# Patient Record
Sex: MALE | Race: OTHER | HISPANIC OR LATINO
[De-identification: names, ages, dates, MRNs, and addresses within clinical notes are randomized per-mention and may not be internally consistent; named-entity substitution may affect disease eponyms.]

---

## 2017-01-24 PROBLEM — Z00.00 ENCOUNTER FOR PREVENTIVE HEALTH EXAMINATION: Status: ACTIVE | Noted: 2017-01-24

## 2017-02-14 ENCOUNTER — APPOINTMENT (OUTPATIENT)
Dept: ORTHOPEDIC SURGERY | Facility: CLINIC | Age: 67
End: 2017-02-14

## 2017-02-14 VITALS
WEIGHT: 200 LBS | BODY MASS INDEX: 33.32 KG/M2 | DIASTOLIC BLOOD PRESSURE: 87 MMHG | HEART RATE: 104 BPM | HEIGHT: 65 IN | SYSTOLIC BLOOD PRESSURE: 143 MMHG

## 2017-02-14 DIAGNOSIS — Z78.9 OTHER SPECIFIED HEALTH STATUS: ICD-10-CM

## 2017-02-14 DIAGNOSIS — Z86.79 PERSONAL HISTORY OF OTHER DISEASES OF THE CIRCULATORY SYSTEM: ICD-10-CM

## 2017-02-14 DIAGNOSIS — M47.812 SPONDYLOSIS W/OUT MYELOPATHY OR RADICULOPATHY, CERVICAL REGION: ICD-10-CM

## 2017-02-14 RX ORDER — LOSARTAN POTASSIUM 100 MG/1
TABLET, FILM COATED ORAL
Refills: 0 | Status: ACTIVE | COMMUNITY

## 2017-02-16 ENCOUNTER — APPOINTMENT (OUTPATIENT)
Dept: PHYSICAL MEDICINE AND REHAB | Facility: CLINIC | Age: 67
End: 2017-02-16

## 2017-02-16 VITALS
SYSTOLIC BLOOD PRESSURE: 147 MMHG | BODY MASS INDEX: 33.32 KG/M2 | WEIGHT: 200 LBS | OXYGEN SATURATION: 96 % | HEART RATE: 107 BPM | DIASTOLIC BLOOD PRESSURE: 86 MMHG | HEIGHT: 65 IN

## 2017-02-16 DIAGNOSIS — M41.25 OTHER IDIOPATHIC SCOLIOSIS, THORACOLUMBAR REGION: ICD-10-CM

## 2017-02-21 ENCOUNTER — APPOINTMENT (OUTPATIENT)
Dept: PHYSICAL MEDICINE AND REHAB | Facility: CLINIC | Age: 67
End: 2017-02-21

## 2017-02-22 ENCOUNTER — OUTPATIENT (OUTPATIENT)
Dept: OUTPATIENT SERVICES | Facility: HOSPITAL | Age: 67
LOS: 1 days | End: 2017-02-22
Payer: COMMERCIAL

## 2017-02-22 DIAGNOSIS — Z51.89 ENCOUNTER FOR OTHER SPECIFIED AFTERCARE: ICD-10-CM

## 2017-02-22 DIAGNOSIS — M45.5 ANKYLOSING SPONDYLITIS OF THORACOLUMBAR REGION: ICD-10-CM

## 2017-05-01 ENCOUNTER — APPOINTMENT (OUTPATIENT)
Dept: PHYSICAL MEDICINE AND REHAB | Facility: CLINIC | Age: 67
End: 2017-05-01

## 2017-05-01 VITALS
SYSTOLIC BLOOD PRESSURE: 158 MMHG | WEIGHT: 200 LBS | HEIGHT: 65 IN | OXYGEN SATURATION: 97 % | BODY MASS INDEX: 33.32 KG/M2 | DIASTOLIC BLOOD PRESSURE: 82 MMHG | HEART RATE: 107 BPM

## 2017-05-01 DIAGNOSIS — M47.816 SPONDYLOSIS W/OUT MYELOPATHY OR RADICULOPATHY, LUMBAR REGION: ICD-10-CM

## 2017-05-01 DIAGNOSIS — M60.9 MYOSITIS, UNSPECIFIED: ICD-10-CM

## 2017-05-03 PROBLEM — M47.816 LUMBAR SPONDYLOSIS: Status: ACTIVE | Noted: 2017-02-14

## 2017-05-03 PROBLEM — M60.9 MYOSITIS: Status: ACTIVE | Noted: 2017-05-03

## 2017-05-10 PROCEDURE — 97162 PT EVAL MOD COMPLEX 30 MIN: CPT

## 2017-05-10 PROCEDURE — 97010 HOT OR COLD PACKS THERAPY: CPT

## 2017-05-10 PROCEDURE — 97140 MANUAL THERAPY 1/> REGIONS: CPT

## 2017-05-10 PROCEDURE — 97110 THERAPEUTIC EXERCISES: CPT

## 2017-06-05 ENCOUNTER — APPOINTMENT (OUTPATIENT)
Dept: PHYSICAL MEDICINE AND REHAB | Facility: CLINIC | Age: 67
End: 2017-06-05

## 2017-06-18 ENCOUNTER — EMERGENCY (EMERGENCY)
Facility: HOSPITAL | Age: 67
LOS: 1 days | Discharge: HOME | End: 2017-06-18
Attending: EMERGENCY MEDICINE
Payer: COMMERCIAL

## 2017-06-18 VITALS
SYSTOLIC BLOOD PRESSURE: 152 MMHG | WEIGHT: 190.04 LBS | HEIGHT: 65 IN | OXYGEN SATURATION: 98 % | RESPIRATION RATE: 20 BRPM | HEART RATE: 125 BPM | TEMPERATURE: 99 F | DIASTOLIC BLOOD PRESSURE: 90 MMHG

## 2017-06-18 VITALS
RESPIRATION RATE: 16 BRPM | HEART RATE: 84 BPM | OXYGEN SATURATION: 98 % | TEMPERATURE: 98 F | DIASTOLIC BLOOD PRESSURE: 81 MMHG | SYSTOLIC BLOOD PRESSURE: 136 MMHG

## 2017-06-18 LAB
ALBUMIN SERPL ELPH-MCNC: 4.3 G/DL — SIGNIFICANT CHANGE UP (ref 3.3–5.2)
ALP SERPL-CCNC: 75 U/L — SIGNIFICANT CHANGE UP (ref 40–120)
ALT FLD-CCNC: 33 U/L — SIGNIFICANT CHANGE UP
ANION GAP SERPL CALC-SCNC: 15 MMOL/L — SIGNIFICANT CHANGE UP (ref 5–17)
APTT BLD: 28.5 SEC — SIGNIFICANT CHANGE UP (ref 27.5–37.4)
AST SERPL-CCNC: 12 U/L — SIGNIFICANT CHANGE UP
BASOPHILS # BLD AUTO: 0 K/UL — SIGNIFICANT CHANGE UP (ref 0–0.2)
BASOPHILS NFR BLD AUTO: 0.1 % — SIGNIFICANT CHANGE UP (ref 0–2)
BILIRUB SERPL-MCNC: 0.2 MG/DL — LOW (ref 0.4–2)
BUN SERPL-MCNC: 19 MG/DL — SIGNIFICANT CHANGE UP (ref 8–20)
CALCIUM SERPL-MCNC: 9.3 MG/DL — SIGNIFICANT CHANGE UP (ref 8.6–10.2)
CHLORIDE SERPL-SCNC: 99 MMOL/L — SIGNIFICANT CHANGE UP (ref 98–107)
CO2 SERPL-SCNC: 25 MMOL/L — SIGNIFICANT CHANGE UP (ref 22–29)
CREAT SERPL-MCNC: 0.86 MG/DL — SIGNIFICANT CHANGE UP (ref 0.5–1.3)
D DIMER BLD IA.RAPID-MCNC: <150 NG/ML DDU — SIGNIFICANT CHANGE UP
GLUCOSE SERPL-MCNC: 254 MG/DL — HIGH (ref 70–115)
HCT VFR BLD CALC: 42.3 % — SIGNIFICANT CHANGE UP (ref 42–52)
HGB BLD-MCNC: 14 G/DL — SIGNIFICANT CHANGE UP (ref 14–18)
INR BLD: 0.99 RATIO — SIGNIFICANT CHANGE UP (ref 0.88–1.16)
LYMPHOCYTES # BLD AUTO: 16.1 % — LOW (ref 20–55)
LYMPHOCYTES # BLD AUTO: 2.5 K/UL — SIGNIFICANT CHANGE UP (ref 1–4.8)
MCHC RBC-ENTMCNC: 27.8 PG — SIGNIFICANT CHANGE UP (ref 27–31)
MCHC RBC-ENTMCNC: 33.1 G/DL — SIGNIFICANT CHANGE UP (ref 32–36)
MCV RBC AUTO: 84.1 FL — SIGNIFICANT CHANGE UP (ref 80–94)
MONOCYTES # BLD AUTO: 1.2 K/UL — HIGH (ref 0–0.8)
MONOCYTES NFR BLD AUTO: 7.8 % — SIGNIFICANT CHANGE UP (ref 3–10)
NEUTROPHILS # BLD AUTO: 11.9 K/UL — HIGH (ref 1.8–8)
NEUTROPHILS NFR BLD AUTO: 75.1 % — HIGH (ref 37–73)
NT-PROBNP SERPL-SCNC: 41 PG/ML — SIGNIFICANT CHANGE UP (ref 0–300)
PLATELET # BLD AUTO: 367 K/UL — SIGNIFICANT CHANGE UP (ref 150–400)
POTASSIUM SERPL-MCNC: 4.2 MMOL/L — SIGNIFICANT CHANGE UP (ref 3.5–5.3)
POTASSIUM SERPL-SCNC: 4.2 MMOL/L — SIGNIFICANT CHANGE UP (ref 3.5–5.3)
PROT SERPL-MCNC: 7.3 G/DL — SIGNIFICANT CHANGE UP (ref 6.6–8.7)
PROTHROM AB SERPL-ACNC: 10.9 SEC — SIGNIFICANT CHANGE UP (ref 9.8–12.7)
RBC # BLD: 5.03 M/UL — SIGNIFICANT CHANGE UP (ref 4.6–6.2)
RBC # FLD: 14.5 % — SIGNIFICANT CHANGE UP (ref 11–15.6)
SODIUM SERPL-SCNC: 139 MMOL/L — SIGNIFICANT CHANGE UP (ref 135–145)
TROPONIN T SERPL-MCNC: <0.01 NG/ML — SIGNIFICANT CHANGE UP (ref 0–0.06)
WBC # BLD: 15.8 K/UL — HIGH (ref 4.8–10.8)
WBC # FLD AUTO: 15.8 K/UL — HIGH (ref 4.8–10.8)

## 2017-06-18 PROCEDURE — 99285 EMERGENCY DEPT VISIT HI MDM: CPT

## 2017-06-18 PROCEDURE — 85027 COMPLETE CBC AUTOMATED: CPT

## 2017-06-18 PROCEDURE — 94640 AIRWAY INHALATION TREATMENT: CPT

## 2017-06-18 PROCEDURE — 93005 ELECTROCARDIOGRAM TRACING: CPT

## 2017-06-18 PROCEDURE — 84484 ASSAY OF TROPONIN QUANT: CPT

## 2017-06-18 PROCEDURE — 80053 COMPREHEN METABOLIC PANEL: CPT

## 2017-06-18 PROCEDURE — 93010 ELECTROCARDIOGRAM REPORT: CPT

## 2017-06-18 PROCEDURE — 85610 PROTHROMBIN TIME: CPT

## 2017-06-18 PROCEDURE — 71020: CPT | Mod: 26

## 2017-06-18 PROCEDURE — 71046 X-RAY EXAM CHEST 2 VIEWS: CPT

## 2017-06-18 PROCEDURE — 99284 EMERGENCY DEPT VISIT MOD MDM: CPT | Mod: 25

## 2017-06-18 PROCEDURE — 85379 FIBRIN DEGRADATION QUANT: CPT

## 2017-06-18 PROCEDURE — 83880 ASSAY OF NATRIURETIC PEPTIDE: CPT

## 2017-06-18 PROCEDURE — 85730 THROMBOPLASTIN TIME PARTIAL: CPT

## 2017-06-18 PROCEDURE — T1013: CPT

## 2017-06-18 RX ORDER — IPRATROPIUM/ALBUTEROL SULFATE 18-103MCG
3 AEROSOL WITH ADAPTER (GRAM) INHALATION ONCE
Qty: 0 | Refills: 0 | Status: COMPLETED | OUTPATIENT
Start: 2017-06-18 | End: 2017-06-18

## 2017-06-18 RX ORDER — SODIUM CHLORIDE 9 MG/ML
3 INJECTION INTRAMUSCULAR; INTRAVENOUS; SUBCUTANEOUS ONCE
Qty: 0 | Refills: 0 | Status: COMPLETED | OUTPATIENT
Start: 2017-06-18 | End: 2017-06-18

## 2017-06-18 RX ADMIN — Medication 50 MILLIGRAM(S): at 18:25

## 2017-06-18 RX ADMIN — Medication 3 MILLILITER(S): at 18:25

## 2017-06-18 RX ADMIN — SODIUM CHLORIDE 3 MILLILITER(S): 9 INJECTION INTRAMUSCULAR; INTRAVENOUS; SUBCUTANEOUS at 16:01

## 2017-06-18 RX ADMIN — Medication 1 TABLET(S): at 18:25

## 2017-06-18 RX ADMIN — Medication 3 MILLILITER(S): at 16:01

## 2017-06-18 NOTE — ED ADULT NURSE NOTE - OBJECTIVE STATEMENT
Patient reports reaction to cleaning product 2 weeks ago that caused SOB, went to PMD and was prescribed nebulizer and steroid. Patient reports sob persist worsening at night with pleuritic chest discomfort. Patient lungs cta b/l no acute distress noted. no peripheral edema noted.

## 2017-06-18 NOTE — ED PROVIDER NOTE - OBJECTIVE STATEMENT
67 year old male former smoker presents with sob. pt states that 2 weeks ago he used a cleaning solution on his floor and it caused him to have difficulty breathing. He wnet to his PCP and was given 2 inhalers and prednisone which he is still taking. pt states that he continues to wheeze at night and today he had fever and chills. pt also c/o pnd and orthopnea but no pedal edema. pt also denies any recent travel or leg pain

## 2017-06-18 NOTE — ED PROVIDER NOTE - PROGRESS NOTE DETAILS
pt feels much better and is able to ambulate without sob. pt to follow up with pulmonary and to continue to use his inhalers and prednisone 50mg tablets daily as previously

## 2017-07-03 ENCOUNTER — OUTPATIENT (OUTPATIENT)
Dept: OUTPATIENT SERVICES | Facility: HOSPITAL | Age: 67
LOS: 1 days | End: 2017-07-03
Payer: COMMERCIAL

## 2017-07-03 ENCOUNTER — EMERGENCY (EMERGENCY)
Facility: HOSPITAL | Age: 67
LOS: 1 days | Discharge: HOME | End: 2017-07-03
Attending: EMERGENCY MEDICINE
Payer: COMMERCIAL

## 2017-07-03 ENCOUNTER — APPOINTMENT (OUTPATIENT)
Dept: PHYSICAL MEDICINE AND REHAB | Facility: CLINIC | Age: 67
End: 2017-07-03

## 2017-07-03 VITALS
RESPIRATION RATE: 20 BRPM | HEART RATE: 123 BPM | WEIGHT: 179.9 LBS | TEMPERATURE: 98 F | DIASTOLIC BLOOD PRESSURE: 76 MMHG | OXYGEN SATURATION: 98 % | SYSTOLIC BLOOD PRESSURE: 117 MMHG

## 2017-07-03 VITALS
DIASTOLIC BLOOD PRESSURE: 82 MMHG | HEART RATE: 89 BPM | OXYGEN SATURATION: 99 % | SYSTOLIC BLOOD PRESSURE: 148 MMHG | RESPIRATION RATE: 20 BRPM

## 2017-07-03 DIAGNOSIS — M47.816 SPONDYLOSIS WITHOUT MYELOPATHY OR RADICULOPATHY, LUMBAR REGION: ICD-10-CM

## 2017-07-03 LAB
ALBUMIN SERPL ELPH-MCNC: 4.1 G/DL — SIGNIFICANT CHANGE UP (ref 3.3–5.2)
ALP SERPL-CCNC: 85 U/L — SIGNIFICANT CHANGE UP (ref 40–120)
ALT FLD-CCNC: 42 U/L — HIGH
ANION GAP SERPL CALC-SCNC: 15 MMOL/L — SIGNIFICANT CHANGE UP (ref 5–17)
APTT BLD: 29.7 SEC — SIGNIFICANT CHANGE UP (ref 27.5–37.4)
AST SERPL-CCNC: 18 U/L — SIGNIFICANT CHANGE UP
BASOPHILS # BLD AUTO: 0 K/UL — SIGNIFICANT CHANGE UP (ref 0–0.2)
BASOPHILS NFR BLD AUTO: 0.2 % — SIGNIFICANT CHANGE UP (ref 0–2)
BILIRUB SERPL-MCNC: 0.6 MG/DL — SIGNIFICANT CHANGE UP (ref 0.4–2)
BUN SERPL-MCNC: 19 MG/DL — SIGNIFICANT CHANGE UP (ref 8–20)
CALCIUM SERPL-MCNC: 8.4 MG/DL — LOW (ref 8.6–10.2)
CHLORIDE SERPL-SCNC: 98 MMOL/L — SIGNIFICANT CHANGE UP (ref 98–107)
CO2 SERPL-SCNC: 24 MMOL/L — SIGNIFICANT CHANGE UP (ref 22–29)
CREAT SERPL-MCNC: 0.89 MG/DL — SIGNIFICANT CHANGE UP (ref 0.5–1.3)
EOSINOPHIL # BLD AUTO: 0.2 K/UL — SIGNIFICANT CHANGE UP (ref 0–0.5)
EOSINOPHIL NFR BLD AUTO: 1.7 % — SIGNIFICANT CHANGE UP (ref 0–5)
GLUCOSE SERPL-MCNC: 327 MG/DL — HIGH (ref 70–115)
HCT VFR BLD CALC: 40.9 % — LOW (ref 42–52)
HGB BLD-MCNC: 13.6 G/DL — LOW (ref 14–18)
INR BLD: 1.04 RATIO — SIGNIFICANT CHANGE UP (ref 0.88–1.16)
LYMPHOCYTES # BLD AUTO: 2.7 K/UL — SIGNIFICANT CHANGE UP (ref 1–4.8)
LYMPHOCYTES # BLD AUTO: 31.4 % — SIGNIFICANT CHANGE UP (ref 20–55)
MCHC RBC-ENTMCNC: 27.6 PG — SIGNIFICANT CHANGE UP (ref 27–31)
MCHC RBC-ENTMCNC: 33.3 G/DL — SIGNIFICANT CHANGE UP (ref 32–36)
MCV RBC AUTO: 83.1 FL — SIGNIFICANT CHANGE UP (ref 80–94)
MONOCYTES # BLD AUTO: 0.7 K/UL — SIGNIFICANT CHANGE UP (ref 0–0.8)
MONOCYTES NFR BLD AUTO: 8.3 % — SIGNIFICANT CHANGE UP (ref 3–10)
NEUTROPHILS # BLD AUTO: 5 K/UL — SIGNIFICANT CHANGE UP (ref 1.8–8)
NEUTROPHILS NFR BLD AUTO: 58.1 % — SIGNIFICANT CHANGE UP (ref 37–73)
PLATELET # BLD AUTO: 230 K/UL — SIGNIFICANT CHANGE UP (ref 150–400)
POTASSIUM SERPL-MCNC: 4.3 MMOL/L — SIGNIFICANT CHANGE UP (ref 3.5–5.3)
POTASSIUM SERPL-SCNC: 4.3 MMOL/L — SIGNIFICANT CHANGE UP (ref 3.5–5.3)
PROT SERPL-MCNC: 7.2 G/DL — SIGNIFICANT CHANGE UP (ref 6.6–8.7)
PROTHROM AB SERPL-ACNC: 11.4 SEC — SIGNIFICANT CHANGE UP (ref 9.8–12.7)
RBC # BLD: 4.92 M/UL — SIGNIFICANT CHANGE UP (ref 4.6–6.2)
RBC # FLD: 13.8 % — SIGNIFICANT CHANGE UP (ref 11–15.6)
SODIUM SERPL-SCNC: 137 MMOL/L — SIGNIFICANT CHANGE UP (ref 135–145)
WBC # BLD: 8.6 K/UL — SIGNIFICANT CHANGE UP (ref 4.8–10.8)
WBC # FLD AUTO: 8.6 K/UL — SIGNIFICANT CHANGE UP (ref 4.8–10.8)

## 2017-07-03 PROCEDURE — 96374 THER/PROPH/DIAG INJ IV PUSH: CPT

## 2017-07-03 PROCEDURE — T1013: CPT

## 2017-07-03 PROCEDURE — 36415 COLL VENOUS BLD VENIPUNCTURE: CPT

## 2017-07-03 PROCEDURE — 99284 EMERGENCY DEPT VISIT MOD MDM: CPT | Mod: 25

## 2017-07-03 PROCEDURE — 85027 COMPLETE CBC AUTOMATED: CPT

## 2017-07-03 PROCEDURE — 85379 FIBRIN DEGRADATION QUANT: CPT

## 2017-07-03 PROCEDURE — 93010 ELECTROCARDIOGRAM REPORT: CPT

## 2017-07-03 PROCEDURE — 76000 FLUOROSCOPY <1 HR PHYS/QHP: CPT

## 2017-07-03 PROCEDURE — 99284 EMERGENCY DEPT VISIT MOD MDM: CPT

## 2017-07-03 PROCEDURE — 93005 ELECTROCARDIOGRAM TRACING: CPT

## 2017-07-03 PROCEDURE — 85730 THROMBOPLASTIN TIME PARTIAL: CPT

## 2017-07-03 PROCEDURE — 80053 COMPREHEN METABOLIC PANEL: CPT

## 2017-07-03 PROCEDURE — 85610 PROTHROMBIN TIME: CPT

## 2017-07-03 RX ORDER — INSULIN HUMAN 100 [IU]/ML
5 INJECTION, SOLUTION SUBCUTANEOUS ONCE
Qty: 0 | Refills: 0 | Status: COMPLETED | OUTPATIENT
Start: 2017-07-03 | End: 2017-07-03

## 2017-07-03 RX ADMIN — INSULIN HUMAN 5 UNIT(S): 100 INJECTION, SOLUTION SUBCUTANEOUS at 21:04

## 2017-07-03 NOTE — ED PROVIDER NOTE - OBJECTIVE STATEMENT
66 year old male with a h/o copd presents with elevated heart rate. pt has a h/o back pain and went to see his doctor to get an injection and was foud to have a elevated heart rate. He was given fluids and sent to the ed for further work-up... pt denies any chest pain , sob cough or fever or abdominal pain or diarrhea

## 2017-07-03 NOTE — ED ADULT TRIAGE NOTE - CHIEF COMPLAINT QUOTE
was at Dr office for back pain and injection, they noticed rapid and irregular heart rate. they gave 2 liters IVF. no pain at this time, no dyspnea.

## 2017-07-03 NOTE — ED PROVIDER NOTE - CARE PLAN
Principal Discharge DX:	Hyperglycemia  Secondary Diagnosis:	Dehydration  Secondary Diagnosis:	Sinus tachycardia by electrocardiogram

## 2017-07-08 ENCOUNTER — INPATIENT (INPATIENT)
Facility: HOSPITAL | Age: 67
LOS: 3 days | Discharge: HOME | DRG: 639 | End: 2017-07-12
Attending: FAMILY MEDICINE | Admitting: INTERNAL MEDICINE
Payer: COMMERCIAL

## 2017-07-08 VITALS
DIASTOLIC BLOOD PRESSURE: 77 MMHG | OXYGEN SATURATION: 99 % | HEIGHT: 65 IN | WEIGHT: 199.96 LBS | RESPIRATION RATE: 16 BRPM | HEART RATE: 82 BPM | SYSTOLIC BLOOD PRESSURE: 127 MMHG | TEMPERATURE: 97 F

## 2017-07-08 DIAGNOSIS — E11.01 TYPE 2 DIABETES MELLITUS WITH HYPEROSMOLARITY WITH COMA: ICD-10-CM

## 2017-07-08 DIAGNOSIS — I10 ESSENTIAL (PRIMARY) HYPERTENSION: ICD-10-CM

## 2017-07-08 DIAGNOSIS — E11.9 TYPE 2 DIABETES MELLITUS WITHOUT COMPLICATIONS: ICD-10-CM

## 2017-07-08 LAB
ALBUMIN SERPL ELPH-MCNC: 4.4 G/DL — SIGNIFICANT CHANGE UP (ref 3.3–5.2)
ALP SERPL-CCNC: 101 U/L — SIGNIFICANT CHANGE UP (ref 40–120)
ALT FLD-CCNC: 39 U/L — SIGNIFICANT CHANGE UP
ANION GAP SERPL CALC-SCNC: 12 MMOL/L — SIGNIFICANT CHANGE UP (ref 5–17)
ANION GAP SERPL CALC-SCNC: 18 MMOL/L — HIGH (ref 5–17)
ANION GAP SERPL CALC-SCNC: 20 MMOL/L — HIGH (ref 5–17)
APPEARANCE UR: CLEAR — SIGNIFICANT CHANGE UP
APPEARANCE UR: CLEAR — SIGNIFICANT CHANGE UP
AST SERPL-CCNC: 15 U/L — SIGNIFICANT CHANGE UP
BACTERIA # UR AUTO: ABNORMAL
BASE EXCESS BLDA CALC-SCNC: -1.9 MMOL/L — SIGNIFICANT CHANGE UP (ref -3–3)
BILIRUB SERPL-MCNC: 0.4 MG/DL — SIGNIFICANT CHANGE UP (ref 0.4–2)
BILIRUB UR-MCNC: NEGATIVE — SIGNIFICANT CHANGE UP
BILIRUB UR-MCNC: NEGATIVE — SIGNIFICANT CHANGE UP
BUN SERPL-MCNC: 23 MG/DL — HIGH (ref 8–20)
BUN SERPL-MCNC: 32 MG/DL — HIGH (ref 8–20)
BUN SERPL-MCNC: 36 MG/DL — HIGH (ref 8–20)
CALCIUM SERPL-MCNC: 10 MG/DL — SIGNIFICANT CHANGE UP (ref 8.6–10.2)
CALCIUM SERPL-MCNC: 7.8 MG/DL — LOW (ref 8.6–10.2)
CALCIUM SERPL-MCNC: 9.7 MG/DL — SIGNIFICANT CHANGE UP (ref 8.6–10.2)
CHLORIDE SERPL-SCNC: 109 MMOL/L — HIGH (ref 98–107)
CHLORIDE SERPL-SCNC: 89 MMOL/L — LOW (ref 98–107)
CHLORIDE SERPL-SCNC: 98 MMOL/L — SIGNIFICANT CHANGE UP (ref 98–107)
CO2 SERPL-SCNC: 21 MMOL/L — LOW (ref 22–29)
CO2 SERPL-SCNC: 22 MMOL/L — SIGNIFICANT CHANGE UP (ref 22–29)
CO2 SERPL-SCNC: 23 MMOL/L — SIGNIFICANT CHANGE UP (ref 22–29)
COLOR SPEC: YELLOW — SIGNIFICANT CHANGE UP
COLOR SPEC: YELLOW — SIGNIFICANT CHANGE UP
CREAT SERPL-MCNC: 0.87 MG/DL — SIGNIFICANT CHANGE UP (ref 0.5–1.3)
CREAT SERPL-MCNC: 1.15 MG/DL — SIGNIFICANT CHANGE UP (ref 0.5–1.3)
CREAT SERPL-MCNC: 1.21 MG/DL — SIGNIFICANT CHANGE UP (ref 0.5–1.3)
DIFF PNL FLD: NEGATIVE — SIGNIFICANT CHANGE UP
DIFF PNL FLD: NEGATIVE — SIGNIFICANT CHANGE UP
EPI CELLS # UR: SIGNIFICANT CHANGE UP
GAS PNL BLDA: SIGNIFICANT CHANGE UP
GLUCOSE SERPL-MCNC: 248 MG/DL — HIGH (ref 70–115)
GLUCOSE SERPL-MCNC: 533 MG/DL — CRITICAL HIGH (ref 70–115)
GLUCOSE SERPL-MCNC: 933 MG/DL — CRITICAL HIGH (ref 70–115)
GLUCOSE UR QL: 1000 MG/DL
GLUCOSE UR QL: 250 MG/DL
HCO3 BLDA-SCNC: 23 MMOL/L — SIGNIFICANT CHANGE UP (ref 20–26)
HCT VFR BLD CALC: 40.1 % — LOW (ref 42–52)
HGB BLD-MCNC: 13.6 G/DL — LOW (ref 14–18)
HOROWITZ INDEX BLDA+IHG-RTO: 0.21 — SIGNIFICANT CHANGE UP
KETONES UR-MCNC: ABNORMAL
KETONES UR-MCNC: ABNORMAL
LEUKOCYTE ESTERASE UR-ACNC: NEGATIVE — SIGNIFICANT CHANGE UP
LEUKOCYTE ESTERASE UR-ACNC: NEGATIVE — SIGNIFICANT CHANGE UP
MCHC RBC-ENTMCNC: 27.8 PG — SIGNIFICANT CHANGE UP (ref 27–31)
MCHC RBC-ENTMCNC: 33.7 G/DL — SIGNIFICANT CHANGE UP (ref 32–36)
MCV RBC AUTO: 81.5 FL — SIGNIFICANT CHANGE UP (ref 80–94)
NITRITE UR-MCNC: NEGATIVE — SIGNIFICANT CHANGE UP
NITRITE UR-MCNC: NEGATIVE — SIGNIFICANT CHANGE UP
PCO2 BLDA: 39 MMHG — SIGNIFICANT CHANGE UP (ref 35–45)
PH BLDA: 7.38 — SIGNIFICANT CHANGE UP (ref 7.35–7.45)
PH UR: 5 — SIGNIFICANT CHANGE UP (ref 5–8)
PH UR: 6 — SIGNIFICANT CHANGE UP (ref 5–8)
PLATELET # BLD AUTO: 255 K/UL — SIGNIFICANT CHANGE UP (ref 150–400)
PO2 BLDA: 71 MMHG — LOW (ref 83–108)
POTASSIUM SERPL-MCNC: 3.6 MMOL/L — SIGNIFICANT CHANGE UP (ref 3.5–5.3)
POTASSIUM SERPL-MCNC: 3.7 MMOL/L — SIGNIFICANT CHANGE UP (ref 3.5–5.3)
POTASSIUM SERPL-MCNC: 5.4 MMOL/L — HIGH (ref 3.5–5.3)
POTASSIUM SERPL-SCNC: 3.6 MMOL/L — SIGNIFICANT CHANGE UP (ref 3.5–5.3)
POTASSIUM SERPL-SCNC: 3.7 MMOL/L — SIGNIFICANT CHANGE UP (ref 3.5–5.3)
POTASSIUM SERPL-SCNC: 5.4 MMOL/L — HIGH (ref 3.5–5.3)
PROT SERPL-MCNC: 7.5 G/DL — SIGNIFICANT CHANGE UP (ref 6.6–8.7)
PROT UR-MCNC: 15 MG/DL
PROT UR-MCNC: NEGATIVE MG/DL — SIGNIFICANT CHANGE UP
RBC # BLD: 4.9 M/UL — SIGNIFICANT CHANGE UP (ref 4.6–6.2)
RBC # FLD: 13.3 % — SIGNIFICANT CHANGE UP (ref 11–15.6)
RBC CASTS # UR COMP ASSIST: SIGNIFICANT CHANGE UP /HPF (ref 0–4)
SAO2 % BLDA: 93 % — LOW (ref 95–99)
SODIUM SERPL-SCNC: 129 MMOL/L — LOW (ref 135–145)
SODIUM SERPL-SCNC: 139 MMOL/L — SIGNIFICANT CHANGE UP (ref 135–145)
SODIUM SERPL-SCNC: 144 MMOL/L — SIGNIFICANT CHANGE UP (ref 135–145)
SP GR SPEC: 1 — LOW (ref 1.01–1.02)
SP GR SPEC: 1.01 — SIGNIFICANT CHANGE UP (ref 1.01–1.02)
UROBILINOGEN FLD QL: NEGATIVE MG/DL — SIGNIFICANT CHANGE UP
UROBILINOGEN FLD QL: NEGATIVE MG/DL — SIGNIFICANT CHANGE UP
WBC # BLD: 9.9 K/UL — SIGNIFICANT CHANGE UP (ref 4.8–10.8)
WBC # FLD AUTO: 9.9 K/UL — SIGNIFICANT CHANGE UP (ref 4.8–10.8)
WBC UR QL: SIGNIFICANT CHANGE UP

## 2017-07-08 PROCEDURE — 99223 1ST HOSP IP/OBS HIGH 75: CPT

## 2017-07-08 PROCEDURE — 99284 EMERGENCY DEPT VISIT MOD MDM: CPT

## 2017-07-08 PROCEDURE — 76770 US EXAM ABDO BACK WALL COMP: CPT | Mod: 26

## 2017-07-08 PROCEDURE — 76775 US EXAM ABDO BACK WALL LIM: CPT | Mod: 26

## 2017-07-08 RX ORDER — SODIUM CHLORIDE 9 MG/ML
1000 INJECTION INTRAMUSCULAR; INTRAVENOUS; SUBCUTANEOUS ONCE
Qty: 0 | Refills: 0 | Status: COMPLETED | OUTPATIENT
Start: 2017-07-08 | End: 2017-07-08

## 2017-07-08 RX ORDER — DEXTROSE 50 % IN WATER 50 %
25 SYRINGE (ML) INTRAVENOUS ONCE
Qty: 0 | Refills: 0 | Status: DISCONTINUED | OUTPATIENT
Start: 2017-07-08 | End: 2017-07-09

## 2017-07-08 RX ORDER — INSULIN HUMAN 100 [IU]/ML
5 INJECTION, SOLUTION SUBCUTANEOUS
Qty: 100 | Refills: 0 | Status: DISCONTINUED | OUTPATIENT
Start: 2017-07-08 | End: 2017-07-08

## 2017-07-08 RX ORDER — INSULIN LISPRO 100/ML
VIAL (ML) SUBCUTANEOUS
Qty: 0 | Refills: 0 | Status: DISCONTINUED | OUTPATIENT
Start: 2017-07-08 | End: 2017-07-09

## 2017-07-08 RX ORDER — LOSARTAN POTASSIUM 100 MG/1
100 TABLET, FILM COATED ORAL DAILY
Qty: 0 | Refills: 0 | Status: DISCONTINUED | OUTPATIENT
Start: 2017-07-08 | End: 2017-07-08

## 2017-07-08 RX ORDER — LISINOPRIL 2.5 MG/1
5 TABLET ORAL DAILY
Qty: 0 | Refills: 0 | Status: DISCONTINUED | OUTPATIENT
Start: 2017-07-08 | End: 2017-07-09

## 2017-07-08 RX ORDER — GLUCAGON INJECTION, SOLUTION 0.5 MG/.1ML
1 INJECTION, SOLUTION SUBCUTANEOUS ONCE
Qty: 0 | Refills: 0 | Status: DISCONTINUED | OUTPATIENT
Start: 2017-07-08 | End: 2017-07-09

## 2017-07-08 RX ORDER — METOPROLOL TARTRATE 50 MG
50 TABLET ORAL DAILY
Qty: 0 | Refills: 0 | Status: DISCONTINUED | OUTPATIENT
Start: 2017-07-08 | End: 2017-07-08

## 2017-07-08 RX ORDER — INSULIN HUMAN 100 [IU]/ML
10 INJECTION, SOLUTION SUBCUTANEOUS ONCE
Qty: 0 | Refills: 0 | Status: COMPLETED | OUTPATIENT
Start: 2017-07-08 | End: 2017-07-08

## 2017-07-08 RX ORDER — INSULIN HUMAN 100 [IU]/ML
9 INJECTION, SOLUTION SUBCUTANEOUS
Qty: 100 | Refills: 0 | Status: DISCONTINUED | OUTPATIENT
Start: 2017-07-08 | End: 2017-07-08

## 2017-07-08 RX ORDER — TAMSULOSIN HYDROCHLORIDE 0.4 MG/1
1 CAPSULE ORAL
Qty: 0 | Refills: 0 | COMMUNITY

## 2017-07-08 RX ORDER — METOPROLOL TARTRATE 50 MG
1 TABLET ORAL
Qty: 0 | Refills: 0 | COMMUNITY

## 2017-07-08 RX ORDER — SIMVASTATIN 20 MG/1
10 TABLET, FILM COATED ORAL AT BEDTIME
Qty: 0 | Refills: 0 | Status: DISCONTINUED | OUTPATIENT
Start: 2017-07-08 | End: 2017-07-12

## 2017-07-08 RX ORDER — INSULIN LISPRO 100/ML
VIAL (ML) SUBCUTANEOUS
Qty: 0 | Refills: 0 | Status: DISCONTINUED | OUTPATIENT
Start: 2017-07-08 | End: 2017-07-08

## 2017-07-08 RX ORDER — LOSARTAN POTASSIUM 100 MG/1
100 TABLET, FILM COATED ORAL DAILY
Qty: 0 | Refills: 0 | Status: DISCONTINUED | OUTPATIENT
Start: 2017-07-08 | End: 2017-07-12

## 2017-07-08 RX ORDER — SODIUM CHLORIDE 9 MG/ML
1000 INJECTION, SOLUTION INTRAVENOUS
Qty: 0 | Refills: 0 | Status: DISCONTINUED | OUTPATIENT
Start: 2017-07-08 | End: 2017-07-09

## 2017-07-08 RX ORDER — LOSARTAN POTASSIUM 100 MG/1
1 TABLET, FILM COATED ORAL
Qty: 0 | Refills: 0 | COMMUNITY

## 2017-07-08 RX ORDER — INSULIN HUMAN 100 [IU]/ML
14 INJECTION, SOLUTION SUBCUTANEOUS ONCE
Qty: 0 | Refills: 0 | Status: COMPLETED | OUTPATIENT
Start: 2017-07-08 | End: 2017-07-08

## 2017-07-08 RX ORDER — INSULIN GLARGINE 100 [IU]/ML
20 INJECTION, SOLUTION SUBCUTANEOUS ONCE
Qty: 0 | Refills: 0 | Status: COMPLETED | OUTPATIENT
Start: 2017-07-08 | End: 2017-07-08

## 2017-07-08 RX ORDER — DEXTROSE 50 % IN WATER 50 %
1 SYRINGE (ML) INTRAVENOUS ONCE
Qty: 0 | Refills: 0 | Status: DISCONTINUED | OUTPATIENT
Start: 2017-07-08 | End: 2017-07-09

## 2017-07-08 RX ORDER — DEXTROSE 50 % IN WATER 50 %
12.5 SYRINGE (ML) INTRAVENOUS ONCE
Qty: 0 | Refills: 0 | Status: DISCONTINUED | OUTPATIENT
Start: 2017-07-08 | End: 2017-07-09

## 2017-07-08 RX ORDER — SODIUM CHLORIDE 9 MG/ML
1000 INJECTION, SOLUTION INTRAVENOUS
Qty: 0 | Refills: 0 | Status: DISCONTINUED | OUTPATIENT
Start: 2017-07-08 | End: 2017-07-08

## 2017-07-08 RX ORDER — SIMVASTATIN 20 MG/1
1 TABLET, FILM COATED ORAL
Qty: 0 | Refills: 0 | COMMUNITY

## 2017-07-08 RX ORDER — LISINOPRIL 2.5 MG/1
5 TABLET ORAL DAILY
Qty: 0 | Refills: 0 | Status: DISCONTINUED | OUTPATIENT
Start: 2017-07-08 | End: 2017-07-08

## 2017-07-08 RX ADMIN — INSULIN GLARGINE 20 UNIT(S): 100 INJECTION, SOLUTION SUBCUTANEOUS at 18:30

## 2017-07-08 RX ADMIN — Medication: at 23:50

## 2017-07-08 RX ADMIN — SODIUM CHLORIDE 1000 MILLILITER(S): 9 INJECTION INTRAMUSCULAR; INTRAVENOUS; SUBCUTANEOUS at 13:58

## 2017-07-08 RX ADMIN — SODIUM CHLORIDE 175 MILLILITER(S): 9 INJECTION, SOLUTION INTRAVENOUS at 18:19

## 2017-07-08 RX ADMIN — INSULIN HUMAN 9 UNIT(S)/HR: 100 INJECTION, SOLUTION SUBCUTANEOUS at 18:30

## 2017-07-08 RX ADMIN — SODIUM CHLORIDE 1000 MILLILITER(S): 9 INJECTION INTRAMUSCULAR; INTRAVENOUS; SUBCUTANEOUS at 18:19

## 2017-07-08 RX ADMIN — SODIUM CHLORIDE 1000 MILLILITER(S): 9 INJECTION INTRAMUSCULAR; INTRAVENOUS; SUBCUTANEOUS at 12:13

## 2017-07-08 RX ADMIN — SIMVASTATIN 10 MILLIGRAM(S): 20 TABLET, FILM COATED ORAL at 23:49

## 2017-07-08 RX ADMIN — INSULIN HUMAN 10 UNIT(S): 100 INJECTION, SOLUTION SUBCUTANEOUS at 13:58

## 2017-07-08 RX ADMIN — INSULIN HUMAN 14 UNIT(S): 100 INJECTION, SOLUTION SUBCUTANEOUS at 16:30

## 2017-07-08 RX ADMIN — INSULIN HUMAN 5 UNIT(S)/HR: 100 INJECTION, SOLUTION SUBCUTANEOUS at 19:35

## 2017-07-08 NOTE — H&P ADULT - RS GEN PE MLT RESP DETAILS PC
no subcutaneous emphysema/no wheezes/no rhonchi/airway patent/no rales/respirations non-labored/clear to auscultation bilaterally/no intercostal retractions/breath sounds equal/good air movement

## 2017-07-08 NOTE — ED ADULT TRIAGE NOTE - CHIEF COMPLAINT QUOTE
pt presents to ED with frequent urination, pt states he was seen in ED a few days ago for same symptoms. afebrile. pt c.o burning on urination at times. denies hematuria.

## 2017-07-08 NOTE — ED ADULT NURSE NOTE - PMH
No pertinent past medical history BPH (benign prostatic hyperplasia)    Depression    DM (diabetes mellitus)    HTN (hypertension)    Hyperlipidemia

## 2017-07-08 NOTE — ED STATDOCS - OBJECTIVE STATEMENT
65 y/o M presents to ED c/o frequent urination and dysuria x 1 month. Pt has an appointment on the 17th with urologist. Denies being on Flomax and having. Reports he has trouble sleeping due to constant urination need. Pt was here on Monday for pain management for his L back and was having palpitations. Denies N/V/D, fever, chills, SOB, CP, difficulty breathing, HA, diaphoresis, leg swelling, blurry vision or abd pain.   : Joyce Garcia

## 2017-07-08 NOTE — ED STATDOCS - ATTENDING CONTRIBUTION TO CARE
I, Ab Olivas, performed the initial face to face bedside interview with this patient regarding history of present illness, review of symptoms and relevant past medical, social and family history.  I completed an independent physical examination.  I was the initial provider who evaluated this patient. I have signed out the follow up of any pending tests (i.e. labs, radiological studies) to the ACP.  I have communicated the patient’s plan of care and disposition with the ACP. I, Ramos Garcia, performed the initial face to face bedside interview with this patient regarding history of present illness, review of symptoms and relevant past medical, social and family history.  I completed an independent physical examination.  I was the initial provider who evaluated this patient. I have signed out the follow up of any pending tests (i.e. labs, radiological studies) to the ACP.  I have communicated the patient’s plan of care and disposition with the ACP.

## 2017-07-08 NOTE — ED ADULT NURSE REASSESSMENT NOTE - NS ED NURSE REASSESS COMMENT FT1
Assuming care from previous RN, pt AOx4, denies SOB, resp even and unlabored, LS clear and equal bilaterally, skin warm and dry, color good, patent 20G IV in left AC and 18G IV in right AC, showing NSR on monitor, denies pain/n/v, insulin drip set @ 5 units/hour and plasmalyte @ 175/hour, accucheck 239, ICU MICAELA Bedolla made aware, will reassess after accucheck at 2040, pt resting comfortably in stretcher, pt aware of plan of care, will continue to monitor.

## 2017-07-08 NOTE — H&P ADULT - HISTORY OF PRESENT ILLNESS
65 y/o M with a h/o HTN, HLD, BPH, depression presents to ED with 1 month of worsening polyuria, polydipsia. Patient reports that he has been unable to sleep for the past month due to urinary frequency. Family has noticed that he is always fatigued during the day. Patient has been attributing this to his BPH. Presented with A --> 20. B. AB.38/39/71/23. Denies h/o DM. Denies dizziness, lightheadedness, fever, SOB, CP, palpitations, n/v/d, abdominal pain, recent illness.

## 2017-07-08 NOTE — ED ADULT NURSE REASSESSMENT NOTE - NS ED NURSE REASSESS COMMENT FT1
Report given to receiving RN Gissell, pt and wife aware of plan of care, vitals stable and medications given.

## 2017-07-08 NOTE — ED ADULT NURSE NOTE - OBJECTIVE STATEMENT
pt c/o increased urination with burning urination states he feels he needs a brooks. pt denies pain or dizziness

## 2017-07-08 NOTE — ED STATDOCS - PROGRESS NOTE DETAILS
Pt seen and evaluated. 67 yo M presented to ED with polyuria x 1 month. Pt was seen the other day for CP and dc home and was told that he had "high glucose". Pt f/u with PMD and had labs drawn, but no meds given. Pt denies fevers/chills. No N/V. NO CP or SOB. No abd pain. No weakness or paresthesias. Urine noted to have 1000 glucose and Accu-Chek "HI"/ Pt well appearing. Lungs CTA. CVS S1S2. Abd obese, soft.   Polyuria- likely secondary to DM- Will check labs r/o DKA Pt with critical value of 993. CMP reviewed Anion gap 18. Fluids and insulin ordered. ABG ordered and case discussed and transferred to Dr Carvalho for further care Pt seen by me in room 13. Pt is awake and alert. Has 1 month hx of polyuria, polydipsia and 10 ib weight loss. Labs show mild urine ketosis, significantly elevated BS with mild acidosis and elevated AG. Likely hyperosmolar. Pt seen by me in room 13. Pt is awake and alert. Has 1 month hx of polyuria, polydipsia and 10 ib weight loss. Labs show mild urine ketosis, significantly elevated BS with mild acidosis and elevated AG. Elevated osmolarity. Likely HHS.

## 2017-07-08 NOTE — H&P ADULT - PMH
BPH (benign prostatic hyperplasia)    Depression    DM (diabetes mellitus)    HTN (hypertension)    Hyperlipidemia

## 2017-07-08 NOTE — H&P ADULT - PROBLEM SELECTOR PLAN 1
Received 10u insulin IV and 14u SQ, 20u Lantus. 4L IVF ordered. Started insulin gtt. q 1 hr BG checks. Will titrate gtt accordingly. Repeat BMP @ 8pm. AG will likely close quickly. NPO for now.

## 2017-07-08 NOTE — H&P ADULT - GASTROINTESTINAL DETAILS
nontender/no rebound tenderness/no rigidity/no guarding/no distention/soft/bowel sounds normal/no bruit

## 2017-07-09 DIAGNOSIS — N17.9 ACUTE KIDNEY FAILURE, UNSPECIFIED: ICD-10-CM

## 2017-07-09 DIAGNOSIS — N40.0 BENIGN PROSTATIC HYPERPLASIA WITHOUT LOWER URINARY TRACT SYMPTOMS: ICD-10-CM

## 2017-07-09 DIAGNOSIS — E78.5 HYPERLIPIDEMIA, UNSPECIFIED: ICD-10-CM

## 2017-07-09 DIAGNOSIS — E11.01 TYPE 2 DIABETES MELLITUS WITH HYPEROSMOLARITY WITH COMA: ICD-10-CM

## 2017-07-09 DIAGNOSIS — F32.9 MAJOR DEPRESSIVE DISORDER, SINGLE EPISODE, UNSPECIFIED: ICD-10-CM

## 2017-07-09 DIAGNOSIS — Z29.9 ENCOUNTER FOR PROPHYLACTIC MEASURES, UNSPECIFIED: ICD-10-CM

## 2017-07-09 LAB
ALBUMIN SERPL ELPH-MCNC: 3.1 G/DL — LOW (ref 3.3–5.2)
ALP SERPL-CCNC: 68 U/L — SIGNIFICANT CHANGE UP (ref 40–120)
ALT FLD-CCNC: 28 U/L — SIGNIFICANT CHANGE UP
ANION GAP SERPL CALC-SCNC: 11 MMOL/L — SIGNIFICANT CHANGE UP (ref 5–17)
AST SERPL-CCNC: 17 U/L — SIGNIFICANT CHANGE UP
BASOPHILS # BLD AUTO: 0 K/UL — SIGNIFICANT CHANGE UP (ref 0–0.2)
BASOPHILS NFR BLD AUTO: 0.3 % — SIGNIFICANT CHANGE UP (ref 0–2)
BILIRUB DIRECT SERPL-MCNC: 0.1 MG/DL — SIGNIFICANT CHANGE UP (ref 0–0.3)
BILIRUB INDIRECT FLD-MCNC: 0.2 MG/DL — SIGNIFICANT CHANGE UP (ref 0.2–1)
BILIRUB SERPL-MCNC: 0.3 MG/DL — LOW (ref 0.4–2)
BUN SERPL-MCNC: 19 MG/DL — SIGNIFICANT CHANGE UP (ref 8–20)
CALCIUM SERPL-MCNC: 7.6 MG/DL — LOW (ref 8.6–10.2)
CHLORIDE SERPL-SCNC: 105 MMOL/L — SIGNIFICANT CHANGE UP (ref 98–107)
CO2 SERPL-SCNC: 23 MMOL/L — SIGNIFICANT CHANGE UP (ref 22–29)
CREAT SERPL-MCNC: 0.79 MG/DL — SIGNIFICANT CHANGE UP (ref 0.5–1.3)
EOSINOPHIL # BLD AUTO: 0.3 K/UL — SIGNIFICANT CHANGE UP (ref 0–0.5)
EOSINOPHIL NFR BLD AUTO: 4 % — SIGNIFICANT CHANGE UP (ref 0–5)
GLUCOSE SERPL-MCNC: 164 MG/DL — HIGH (ref 70–115)
HBA1C BLD-MCNC: 10.7 % — HIGH (ref 4–5.6)
HCT VFR BLD CALC: 35.9 % — LOW (ref 42–52)
HGB BLD-MCNC: 12 G/DL — LOW (ref 14–18)
LIDOCAIN IGE QN: 118 U/L — HIGH (ref 22–51)
LYMPHOCYTES # BLD AUTO: 2.6 K/UL — SIGNIFICANT CHANGE UP (ref 1–4.8)
LYMPHOCYTES # BLD AUTO: 37.4 % — SIGNIFICANT CHANGE UP (ref 20–55)
MCHC RBC-ENTMCNC: 27.6 PG — SIGNIFICANT CHANGE UP (ref 27–31)
MCHC RBC-ENTMCNC: 33.4 G/DL — SIGNIFICANT CHANGE UP (ref 32–36)
MCV RBC AUTO: 82.7 FL — SIGNIFICANT CHANGE UP (ref 80–94)
MONOCYTES # BLD AUTO: 0.6 K/UL — SIGNIFICANT CHANGE UP (ref 0–0.8)
MONOCYTES NFR BLD AUTO: 8.6 % — SIGNIFICANT CHANGE UP (ref 3–10)
NEUTROPHILS # BLD AUTO: 3.5 K/UL — SIGNIFICANT CHANGE UP (ref 1.8–8)
NEUTROPHILS NFR BLD AUTO: 49.4 % — SIGNIFICANT CHANGE UP (ref 37–73)
PLATELET # BLD AUTO: 204 K/UL — SIGNIFICANT CHANGE UP (ref 150–400)
POTASSIUM SERPL-MCNC: 3.6 MMOL/L — SIGNIFICANT CHANGE UP (ref 3.5–5.3)
POTASSIUM SERPL-SCNC: 3.6 MMOL/L — SIGNIFICANT CHANGE UP (ref 3.5–5.3)
PROT SERPL-MCNC: 5.9 G/DL — LOW (ref 6.6–8.7)
RBC # BLD: 4.34 M/UL — LOW (ref 4.6–6.2)
RBC # FLD: 13.3 % — SIGNIFICANT CHANGE UP (ref 11–15.6)
SODIUM SERPL-SCNC: 139 MMOL/L — SIGNIFICANT CHANGE UP (ref 135–145)
THEOPHYLLINE SERPL-MCNC: <0.8 UG/ML — LOW (ref 10–20)
WBC # BLD: 7.1 K/UL — SIGNIFICANT CHANGE UP (ref 4.8–10.8)
WBC # FLD AUTO: 7.1 K/UL — SIGNIFICANT CHANGE UP (ref 4.8–10.8)

## 2017-07-09 RX ORDER — INSULIN GLARGINE 100 [IU]/ML
20 INJECTION, SOLUTION SUBCUTANEOUS AT BEDTIME
Qty: 0 | Refills: 0 | Status: DISCONTINUED | OUTPATIENT
Start: 2017-07-09 | End: 2017-07-09

## 2017-07-09 RX ORDER — INSULIN LISPRO 100/ML
VIAL (ML) SUBCUTANEOUS AT BEDTIME
Qty: 0 | Refills: 0 | Status: DISCONTINUED | OUTPATIENT
Start: 2017-07-09 | End: 2017-07-09

## 2017-07-09 RX ORDER — DEXTROSE 50 % IN WATER 50 %
25 SYRINGE (ML) INTRAVENOUS ONCE
Qty: 0 | Refills: 0 | Status: DISCONTINUED | OUTPATIENT
Start: 2017-07-09 | End: 2017-07-09

## 2017-07-09 RX ORDER — ENOXAPARIN SODIUM 100 MG/ML
40 INJECTION SUBCUTANEOUS DAILY
Qty: 0 | Refills: 0 | Status: DISCONTINUED | OUTPATIENT
Start: 2017-07-09 | End: 2017-07-12

## 2017-07-09 RX ORDER — INSULIN LISPRO 100/ML
5 VIAL (ML) SUBCUTANEOUS
Qty: 0 | Refills: 0 | Status: DISCONTINUED | OUTPATIENT
Start: 2017-07-09 | End: 2017-07-09

## 2017-07-09 RX ORDER — INSULIN LISPRO 100/ML
VIAL (ML) SUBCUTANEOUS
Qty: 0 | Refills: 0 | Status: DISCONTINUED | OUTPATIENT
Start: 2017-07-09 | End: 2017-07-09

## 2017-07-09 RX ORDER — SODIUM CHLORIDE 9 MG/ML
1000 INJECTION, SOLUTION INTRAVENOUS
Qty: 0 | Refills: 0 | Status: DISCONTINUED | OUTPATIENT
Start: 2017-07-09 | End: 2017-07-09

## 2017-07-09 RX ORDER — GABAPENTIN 400 MG/1
300 CAPSULE ORAL DAILY
Qty: 0 | Refills: 0 | Status: DISCONTINUED | OUTPATIENT
Start: 2017-07-09 | End: 2017-07-12

## 2017-07-09 RX ORDER — POTASSIUM CHLORIDE 20 MEQ
40 PACKET (EA) ORAL ONCE
Qty: 0 | Refills: 0 | Status: COMPLETED | OUTPATIENT
Start: 2017-07-09 | End: 2017-07-09

## 2017-07-09 RX ORDER — DEXTROSE 50 % IN WATER 50 %
25 SYRINGE (ML) INTRAVENOUS ONCE
Qty: 0 | Refills: 0 | Status: DISCONTINUED | OUTPATIENT
Start: 2017-07-09 | End: 2017-07-12

## 2017-07-09 RX ORDER — INSULIN GLARGINE 100 [IU]/ML
20 INJECTION, SOLUTION SUBCUTANEOUS EVERY MORNING
Qty: 0 | Refills: 0 | Status: DISCONTINUED | OUTPATIENT
Start: 2017-07-09 | End: 2017-07-09

## 2017-07-09 RX ORDER — DEXTROSE 50 % IN WATER 50 %
1 SYRINGE (ML) INTRAVENOUS ONCE
Qty: 0 | Refills: 0 | Status: DISCONTINUED | OUTPATIENT
Start: 2017-07-09 | End: 2017-07-09

## 2017-07-09 RX ORDER — DEXTROSE 50 % IN WATER 50 %
12.5 SYRINGE (ML) INTRAVENOUS ONCE
Qty: 0 | Refills: 0 | Status: DISCONTINUED | OUTPATIENT
Start: 2017-07-09 | End: 2017-07-12

## 2017-07-09 RX ORDER — BACLOFEN 100 %
10 POWDER (GRAM) MISCELLANEOUS DAILY
Qty: 0 | Refills: 0 | Status: DISCONTINUED | OUTPATIENT
Start: 2017-07-09 | End: 2017-07-12

## 2017-07-09 RX ORDER — GLUCAGON INJECTION, SOLUTION 0.5 MG/.1ML
1 INJECTION, SOLUTION SUBCUTANEOUS ONCE
Qty: 0 | Refills: 0 | Status: DISCONTINUED | OUTPATIENT
Start: 2017-07-09 | End: 2017-07-12

## 2017-07-09 RX ORDER — DEXTROSE 50 % IN WATER 50 %
12.5 SYRINGE (ML) INTRAVENOUS ONCE
Qty: 0 | Refills: 0 | Status: DISCONTINUED | OUTPATIENT
Start: 2017-07-09 | End: 2017-07-09

## 2017-07-09 RX ORDER — INSULIN LISPRO 100/ML
VIAL (ML) SUBCUTANEOUS AT BEDTIME
Qty: 0 | Refills: 0 | Status: DISCONTINUED | OUTPATIENT
Start: 2017-07-09 | End: 2017-07-12

## 2017-07-09 RX ORDER — INSULIN GLARGINE 100 [IU]/ML
20 INJECTION, SOLUTION SUBCUTANEOUS AT BEDTIME
Qty: 0 | Refills: 0 | Status: DISCONTINUED | OUTPATIENT
Start: 2017-07-09 | End: 2017-07-10

## 2017-07-09 RX ORDER — DEXTROSE 50 % IN WATER 50 %
1 SYRINGE (ML) INTRAVENOUS ONCE
Qty: 0 | Refills: 0 | Status: DISCONTINUED | OUTPATIENT
Start: 2017-07-09 | End: 2017-07-12

## 2017-07-09 RX ORDER — SODIUM CHLORIDE 9 MG/ML
1000 INJECTION, SOLUTION INTRAVENOUS
Qty: 0 | Refills: 0 | Status: DISCONTINUED | OUTPATIENT
Start: 2017-07-09 | End: 2017-07-10

## 2017-07-09 RX ORDER — INSULIN LISPRO 100/ML
VIAL (ML) SUBCUTANEOUS
Qty: 0 | Refills: 0 | Status: DISCONTINUED | OUTPATIENT
Start: 2017-07-09 | End: 2017-07-12

## 2017-07-09 RX ORDER — GLUCAGON INJECTION, SOLUTION 0.5 MG/.1ML
1 INJECTION, SOLUTION SUBCUTANEOUS ONCE
Qty: 0 | Refills: 0 | Status: DISCONTINUED | OUTPATIENT
Start: 2017-07-09 | End: 2017-07-09

## 2017-07-09 RX ORDER — SODIUM CHLORIDE 9 MG/ML
1000 INJECTION, SOLUTION INTRAVENOUS
Qty: 0 | Refills: 0 | Status: DISCONTINUED | OUTPATIENT
Start: 2017-07-09 | End: 2017-07-12

## 2017-07-09 RX ORDER — INSULIN LISPRO 100/ML
5 VIAL (ML) SUBCUTANEOUS
Qty: 0 | Refills: 0 | Status: DISCONTINUED | OUTPATIENT
Start: 2017-07-09 | End: 2017-07-10

## 2017-07-09 RX ADMIN — Medication 2: at 21:46

## 2017-07-09 RX ADMIN — Medication 5 UNIT(S): at 08:22

## 2017-07-09 RX ADMIN — Medication 5 UNIT(S): at 16:34

## 2017-07-09 RX ADMIN — Medication 2: at 11:06

## 2017-07-09 RX ADMIN — Medication 20 MILLIGRAM(S): at 16:36

## 2017-07-09 RX ADMIN — ENOXAPARIN SODIUM 40 MILLIGRAM(S): 100 INJECTION SUBCUTANEOUS at 11:07

## 2017-07-09 RX ADMIN — Medication 40 MILLIEQUIVALENT(S): at 11:07

## 2017-07-09 RX ADMIN — Medication 10 MILLIGRAM(S): at 11:08

## 2017-07-09 RX ADMIN — LOSARTAN POTASSIUM 100 MILLIGRAM(S): 100 TABLET, FILM COATED ORAL at 05:50

## 2017-07-09 RX ADMIN — Medication 5 MILLIGRAM(S): at 21:46

## 2017-07-09 RX ADMIN — GABAPENTIN 300 MILLIGRAM(S): 400 CAPSULE ORAL at 11:07

## 2017-07-09 RX ADMIN — SIMVASTATIN 10 MILLIGRAM(S): 20 TABLET, FILM COATED ORAL at 21:46

## 2017-07-09 RX ADMIN — Medication 6: at 16:35

## 2017-07-09 RX ADMIN — INSULIN GLARGINE 20 UNIT(S): 100 INJECTION, SOLUTION SUBCUTANEOUS at 21:46

## 2017-07-09 RX ADMIN — Medication 5 UNIT(S): at 11:07

## 2017-07-09 NOTE — PROGRESS NOTE ADULT - ASSESSMENT
65 yo male with pmh of htn, hld, bph, depression presenting with polyuria, polydypsia found to be in hyperosmolar Hyperglycemia State due to undiagnosed DM.

## 2017-07-09 NOTE — PROGRESS NOTE ADULT - PROBLEM SELECTOR PLAN 2
Start Lantus 20 QHS  Start Humalog 5 units premeals Start Lantus 20 QHS  Start Humalog 5 units pre-meals and Moderate HISS Start Lantus 20 QHS  Start Humalog 5 units pre-meals and Moderate HISS  cw ivfs 1/2 nacl with potassium replacement for hydration  check a1c in am

## 2017-07-09 NOTE — PROGRESS NOTE ADULT - PROBLEM SELECTOR PLAN 3
c/w losartan 100mg daily pre-renal, likely due to dehydration from hyperglycemic state  c/w gentle hydration

## 2017-07-09 NOTE — PROGRESS NOTE ADULT - SUBJECTIVE AND OBJECTIVE BOX
Resident Progress Note- ICU downgrade    Patient is a 66y old  Male who presents with a chief complaint of polyuria, polydipsia, found to have a serum blood glucose of 900, diagnoses with Hyperglycemic hyperosmolar state (2017 17:34). Patient admitted and managed under ICU initially and downgraded to medicine resident team.    Patient was treated with ****      OBJECTIVE: Vital Signs Last 24 Hrs  T(C): 36.6 (2017 19:19), Max: 36.7 (2017 14:30)  T(F): 97.8 (2017 19:19), Max: 98.1 (2017 14:30)  HR: 72 (2017 19:19) (72 - 82)  BP: 167/93 (2017 19:19) (127/77 - 167/93)  RR: 20 (2017 19:19) (16 - 20)  SpO2: 95% (2017 19:19) (95% - 99%)  I&O's Summary      PHYSICAL EXAM:  Constitutional: Obese adult male, NAD, well-groomed, well-developed  HEENT: NC AT PERRLA, EOMI, moist mucous membranes  Neck: Supple,  No JVD,   Respiratory: Good respiratory effort, good air entry, clear to auscultation bilaterally  Cardiovascular: S1 and S2, RRR, no M/G/R  Gastrointestinal: BS+, soft, non-tender, non-distended, non-tender  Extremities: No peripheral edema  Vascular: 2+ peripheral pulses  Neurological: A/O x 3, no focal deficits  Psychiatric: Normal mood, normal affect  Musculoskeletal: 5/5 strength b/l upper and lower extremities  Skin: No rashes    MEDICATIONS  (STANDING):  dextrose 5%. 1000 milliLiter(s) (50 mL/Hr) IV Continuous <Continuous>  simvastatin 10 milliGRAM(s) Oral at bedtime  PARoxetine 20 milliGRAM(s) Oral daily  losartan 100 milliGRAM(s) Oral daily  insulin glargine Injectable (LANTUS) 20 Unit(s) SubCutaneous at bedtime  insulin lispro Injectable (HumaLOG) 5 Unit(s) SubCutaneous three times a day before meals  insulin lispro (HumaLOG) corrective regimen sliding scale   SubCutaneous three times a day before meals  dextrose 5%. 1000 milliLiter(s) (50 mL/Hr) IV Continuous <Continuous>  dextrose 50% Injectable 12.5 Gram(s) IV Push once  dextrose 50% Injectable 25 Gram(s) IV Push once  dextrose 50% Injectable 25 Gram(s) IV Push once    MEDICATIONS  (PRN):  benzonatate 100 milliGRAM(s) Oral three times a day PRN Cough  dextrose Gel 1 Dose(s) Oral once PRN Blood Glucose LESS THAN 70 milliGRAM(s)/deciliter  glucagon  Injectable 1 milliGRAM(s) IntraMuscular once PRN Glucose LESS THAN 70 milligrams/deciliter      LABS:                        13.6   9.9   )-----------( 255      ( 2017 11:38 )             40.1     07-08    144  |  109<H>  |  23.0<H>  ----------------------------<  248<H>  3.6   |  23.0  |  0.87    Ca    7.8<L>      2017 21:04    TPro  7.5  /  Alb  4.4  /  TBili  0.4  /  DBili  x   /  AST  15  /  ALT  39  /  AlkPhos  101  07-08      Urinalysis Basic - ( 2017 18:51 )    Color: Yellow / Appearance: Clear / S.015 / pH: x  Gluc: x / Ketone: Small  / Bili: Negative / Urobili: Negative mg/dL   Blood: x / Protein: 15 mg/dL / Nitrite: Negative   Leuk Esterase: Negative / RBC: 0-2 /HPF / WBC 0-2   Sq Epi: x / Non Sq Epi: x / Bacteria: x      UCx       RADIOLOGY & ADDITIONAL TESTS: Resident Progress Note- ICU downgrade    Patient is a 66y old  Male who presents with a chief complaint of polyuria, polydipsia, found to have a serum blood glucose of 900, diagnoses with Hyperglycemic hyperosmolar state (2017 17:34). Patient admitted and managed under ICU initially and downgraded to medicine resident team. Patient was not aware of he had DM.  Patient moved from [place] to  about 1 year ago and starting getting regular medical care recently. He was referred to cardiologist by PMD and has Echo scheduled for 7/10    PMD: Dr. Jono Felton  Cardiologist: [facility]    In ED, Patient was treated with regular insulin 10 units and 14 units and then put on an insulin drip. Patient blood sugar decreased from 900s to 243. Patient also received  NS bolus 1Lx4.    OBJECTIVE: Vital Signs Last 24 Hrs  T(C): 36.6 (2017 19:19), Max: 36.7 (2017 14:30)  T(F): 97.8 (2017 19:19), Max: 98.1 (2017 14:30)  HR: 72 (2017 19:19) (72 - 82)  BP: 167/93 (2017 19:19) (127/77 - 167/93)  RR: 20 (2017 19:19) (16 - 20)  SpO2: 95% (2017 19:19) (95% - 99%)        PHYSICAL EXAM:  Constitutional: Obese adult male, NAD, well-groomed, well-developed  HEENT: NC AT PERRLA, EOMI, moist mucous membranes  Neck: Supple,  No JVD,   Respiratory: Good respiratory effort, good air entry, clear to auscultation bilaterally  Cardiovascular: S1 and S2, RRR, no M/G/R  Gastrointestinal: BS+, soft, non-tender, non-distended, non-tender  Extremities: No peripheral edema  Vascular: 2+ peripheral pulses  Neurological: A/O x 3, no focal deficits  Psychiatric: Normal mood, normal affect  Musculoskeletal: 5/5 strength b/l upper and lower extremities  Skin: No rashes    MEDICATIONS  (STANDING):  dextrose 5%. 1000 milliLiter(s) (50 mL/Hr) IV Continuous <Continuous>  simvastatin 10 milliGRAM(s) Oral at bedtime  PARoxetine 20 milliGRAM(s) Oral daily  losartan 100 milliGRAM(s) Oral daily  insulin glargine Injectable (LANTUS) 20 Unit(s) SubCutaneous at bedtime  insulin lispro Injectable (HumaLOG) 5 Unit(s) SubCutaneous three times a day before meals  insulin lispro (HumaLOG) corrective regimen sliding scale   SubCutaneous three times a day before meals  dextrose 5%. 1000 milliLiter(s) (50 mL/Hr) IV Continuous <Continuous>  dextrose 50% Injectable 12.5 Gram(s) IV Push once  dextrose 50% Injectable 25 Gram(s) IV Push once  dextrose 50% Injectable 25 Gram(s) IV Push once    MEDICATIONS  (PRN):  benzonatate 100 milliGRAM(s) Oral three times a day PRN Cough  dextrose Gel 1 Dose(s) Oral once PRN Blood Glucose LESS THAN 70 milliGRAM(s)/deciliter  glucagon  Injectable 1 milliGRAM(s) IntraMuscular once PRN Glucose LESS THAN 70 milligrams/deciliter      LABS:                        13.6   9.9   )-----------( 255      ( 2017 11:38 )             40.1     07-    144  |  109<H>  |  23.0<H>  ----------------------------<  248<H>  3.6   |  23.0  |  0.87    Ca    7.8<L>      2017 21:04    TPro  7.5  /  Alb  4.4  /  TBili  0.4  /  DBili  x   /  AST  15  /  ALT  39  /  AlkPhos  101  07-08      Urinalysis Basic - ( 2017 18:51 )    Color: Yellow / Appearance: Clear / S.015 / pH: x  Gluc: x / Ketone: Small  / Bili: Negative / Urobili: Negative mg/dL   Blood: x / Protein: 15 mg/dL / Nitrite: Negative   Leuk Esterase: Negative / RBC: 0-2 /HPF / WBC 0-2   Sq Epi: x / Non Sq Epi: x / Bacteria: x      UCx       RADIOLOGY & ADDITIONAL TESTS: Resident Progress Note- ICU downgrade    Patient is a 66y old  Male who presents with a chief complaint of polyuria, polydipsia, found to have a serum blood glucose of 900, diagnoses with Hyperglycemic hyperosmolar state (2017 17:34). Patient admitted and managed under ICU initially and downgraded to medicine resident team. Patient was not aware he had DM.  Patient moved from [place] to  about 1 year ago and starting getting regular medical care recently. He was referred to cardiologist by PMD and has Echo scheduled for 7/10    PMD: Dr. Jono Felton  Cardiologist: [facility]    In ED, Patient was treated with regular insulin 10 units and 14 units and then put on an insulin drip. Patient blood sugar decreased from 900s to 243. Patient also received  NS bolus 1Lx4.    OBJECTIVE: Vital Signs Last 24 Hrs  T(C): 36.6 (2017 19:19), Max: 36.7 (2017 14:30)  T(F): 97.8 (2017 19:19), Max: 98.1 (2017 14:30)  HR: 72 (2017 19:19) (72 - 82)  BP: 167/93 (2017 19:19) (127/77 - 167/93)  RR: 20 (2017 19:19) (16 - 20)  SpO2: 95% (2017 19:19) (95% - 99%)        PHYSICAL EXAM:  Constitutional: Obese adult male, NAD, well-groomed, well-developed  HEENT: NC AT PERRLA, EOMI, moist mucous membranes  Neck: Supple,  No JVD,   Respiratory: Good respiratory effort, good air entry, clear to auscultation bilaterally  Cardiovascular: S1 and S2, RRR, no M/G/R  Gastrointestinal: BS+, soft, non-tender, non-distended, non-tender  Extremities: No peripheral edema  Vascular: 2+ peripheral pulses  Neurological: A/O x 3, no focal deficits  Psychiatric: Normal mood, normal affect  Musculoskeletal: 5/5 strength b/l upper and lower extremities  Skin: No rashes    MEDICATIONS  (STANDING):  dextrose 5%. 1000 milliLiter(s) (50 mL/Hr) IV Continuous <Continuous>  simvastatin 10 milliGRAM(s) Oral at bedtime  PARoxetine 20 milliGRAM(s) Oral daily  losartan 100 milliGRAM(s) Oral daily  insulin glargine Injectable (LANTUS) 20 Unit(s) SubCutaneous at bedtime  insulin lispro Injectable (HumaLOG) 5 Unit(s) SubCutaneous three times a day before meals  insulin lispro (HumaLOG) corrective regimen sliding scale   SubCutaneous three times a day before meals  dextrose 5%. 1000 milliLiter(s) (50 mL/Hr) IV Continuous <Continuous>  dextrose 50% Injectable 12.5 Gram(s) IV Push once  dextrose 50% Injectable 25 Gram(s) IV Push once  dextrose 50% Injectable 25 Gram(s) IV Push once    MEDICATIONS  (PRN):  benzonatate 100 milliGRAM(s) Oral three times a day PRN Cough  dextrose Gel 1 Dose(s) Oral once PRN Blood Glucose LESS THAN 70 milliGRAM(s)/deciliter  glucagon  Injectable 1 milliGRAM(s) IntraMuscular once PRN Glucose LESS THAN 70 milligrams/deciliter      LABS:                        13.6   9.9   )-----------( 255      ( 2017 11:38 )             40.1     -    144  |  109<H>  |  23.0<H>  ----------------------------<  248<H>  3.6   |  23.0  |  0.87    Ca    7.8<L>      2017 21:04    TPro  7.5  /  Alb  4.4  /  TBili  0.4  /  DBili  x   /  AST  15  /  ALT  39  /  AlkPhos  101  07-08      Urinalysis Basic - ( 2017 18:51 )    Color: Yellow / Appearance: Clear / S.015 / pH: x  Gluc: x / Ketone: Small  / Bili: Negative / Urobili: Negative mg/dL   Blood: x / Protein: 15 mg/dL / Nitrite: Negative   Leuk Esterase: Negative / RBC: 0-2 /HPF / WBC 0-2   Sq Epi: x / Non Sq Epi: x / Bacteria: x      UCx       RADIOLOGY & ADDITIONAL TESTS: Resident Progress Note- ICU downgrade    Patient is a 66y old  Male who presents with a chief complaint of polyuria, polydipsia, found to have a serum blood glucose of 900, diagnoses with Hyperglycemic hyperosmolar state (2017 17:34). Patient admitted and managed under ICU initially and downgraded to medicine resident team. Patient was not aware he had DM.  Patient moved from [place]  about 1 year ago and starting getting regular medical care recently. He was referred to cardiologist by PMD and has Echo scheduled for 7/10    PMD: Dr. Jono Felton  Cardiologist: [facility]    In ED, Patient was treated with regular insulin 10 units and 14 units and then put on an insulin drip. Patient blood sugar decreased from 900s to 243. Patient also received  NS bolus 1Lx4.    OBJECTIVE: Vital Signs Last 24 Hrs  T(C): 36.6 (2017 19:19), Max: 36.7 (2017 14:30)  T(F): 97.8 (2017 19:19), Max: 98.1 (2017 14:30)  HR: 72 (2017 19:19) (72 - 82)  BP: 167/93 (2017 19:19) (127/77 - 167/93)  RR: 20 (2017 19:19) (16 - 20)  SpO2: 95% (2017 19:19) (95% - 99%)    PHYSICAL EXAM:  Constitutional: Obese adult male, NAD, well-groomed, well-developed  HEENT: NC AT PERRLA, EOMI, moist mucous membranes  Neck: Supple,  No JVD,   Respiratory: Good respiratory effort, good air entry, clear to auscultation bilaterally  Cardiovascular: S1 and S2, RRR, no M/G/R  Gastrointestinal: BS+, soft, non-tender, non-distended, non-tender  Extremities: No peripheral edema  Vascular: 2+ peripheral pulses  Neurological: A/O x 3, no focal deficits  Psychiatric: Normal mood, normal affect  Musculoskeletal: 5/5 strength b/l upper and lower extremities  Skin: No rashes    MEDICATIONS  (STANDING):  dextrose 5%. 1000 milliLiter(s) (50 mL/Hr) IV Continuous <Continuous>  simvastatin 10 milliGRAM(s) Oral at bedtime  PARoxetine 20 milliGRAM(s) Oral daily  losartan 100 milliGRAM(s) Oral daily  insulin glargine Injectable (LANTUS) 20 Unit(s) SubCutaneous at bedtime  insulin lispro Injectable (HumaLOG) 5 Unit(s) SubCutaneous three times a day before meals  insulin lispro (HumaLOG) corrective regimen sliding scale   SubCutaneous three times a day before meals  dextrose 5%. 1000 milliLiter(s) (50 mL/Hr) IV Continuous <Continuous>  dextrose 50% Injectable 12.5 Gram(s) IV Push once  dextrose 50% Injectable 25 Gram(s) IV Push once  dextrose 50% Injectable 25 Gram(s) IV Push once    MEDICATIONS  (PRN):  benzonatate 100 milliGRAM(s) Oral three times a day PRN Cough  dextrose Gel 1 Dose(s) Oral once PRN Blood Glucose LESS THAN 70 milliGRAM(s)/deciliter  glucagon  Injectable 1 milliGRAM(s) IntraMuscular once PRN Glucose LESS THAN 70 milligrams/deciliter      LABS:                        13.6   9.9   )-----------( 255      ( 2017 11:38 )             40.1     -    144  |  109<H>  |  23.0<H>  ----------------------------<  248<H>  3.6   |  23.0  |  0.87    Ca    7.8<L>      2017 21:04    TPro  7.5  /  Alb  4.4  /  TBili  0.4  /  DBili  x   /  AST  15  /  ALT  39  /  AlkPhos  101  07-08      Urinalysis Basic - ( 2017 18:51 )    Color: Yellow / Appearance: Clear / S.015 / pH: x  Gluc: x / Ketone: Small  / Bili: Negative / Urobili: Negative mg/dL   Blood: x / Protein: 15 mg/dL / Nitrite: Negative   Leuk Esterase: Negative / RBC: 0-2 /HPF / WBC 0-2   Sq Epi: x / Non Sq Epi: x / Bacteria: x      UCx       RADIOLOGY & ADDITIONAL TESTS:

## 2017-07-10 LAB
ALBUMIN SERPL ELPH-MCNC: 3.7 G/DL — SIGNIFICANT CHANGE UP (ref 3.3–5.2)
ALP SERPL-CCNC: 75 U/L — SIGNIFICANT CHANGE UP (ref 40–120)
ALT FLD-CCNC: 28 U/L — SIGNIFICANT CHANGE UP
ANION GAP SERPL CALC-SCNC: 10 MMOL/L — SIGNIFICANT CHANGE UP (ref 5–17)
AST SERPL-CCNC: 19 U/L — SIGNIFICANT CHANGE UP
BASOPHILS # BLD AUTO: 0 K/UL — SIGNIFICANT CHANGE UP (ref 0–0.2)
BASOPHILS NFR BLD AUTO: 0.3 % — SIGNIFICANT CHANGE UP (ref 0–2)
BILIRUB SERPL-MCNC: 0.4 MG/DL — SIGNIFICANT CHANGE UP (ref 0.4–2)
BUN SERPL-MCNC: 10 MG/DL — SIGNIFICANT CHANGE UP (ref 8–20)
CALCIUM SERPL-MCNC: 8.5 MG/DL — LOW (ref 8.6–10.2)
CHLORIDE SERPL-SCNC: 102 MMOL/L — SIGNIFICANT CHANGE UP (ref 98–107)
CO2 SERPL-SCNC: 23 MMOL/L — SIGNIFICANT CHANGE UP (ref 22–29)
CREAT SERPL-MCNC: 0.77 MG/DL — SIGNIFICANT CHANGE UP (ref 0.5–1.3)
CULTURE RESULTS: NO GROWTH — SIGNIFICANT CHANGE UP
EOSINOPHIL # BLD AUTO: 0.2 K/UL — SIGNIFICANT CHANGE UP (ref 0–0.5)
EOSINOPHIL NFR BLD AUTO: 3.7 % — SIGNIFICANT CHANGE UP (ref 0–5)
GLUCOSE SERPL-MCNC: 245 MG/DL — HIGH (ref 70–115)
HCT VFR BLD CALC: 38.3 % — LOW (ref 42–52)
HGB BLD-MCNC: 13.1 G/DL — LOW (ref 14–18)
LYMPHOCYTES # BLD AUTO: 2.3 K/UL — SIGNIFICANT CHANGE UP (ref 1–4.8)
LYMPHOCYTES # BLD AUTO: 38.4 % — SIGNIFICANT CHANGE UP (ref 20–55)
MAGNESIUM SERPL-MCNC: 2 MG/DL — SIGNIFICANT CHANGE UP (ref 1.6–2.6)
MCHC RBC-ENTMCNC: 27.8 PG — SIGNIFICANT CHANGE UP (ref 27–31)
MCHC RBC-ENTMCNC: 34.2 G/DL — SIGNIFICANT CHANGE UP (ref 32–36)
MCV RBC AUTO: 81.1 FL — SIGNIFICANT CHANGE UP (ref 80–94)
MONOCYTES # BLD AUTO: 0.6 K/UL — SIGNIFICANT CHANGE UP (ref 0–0.8)
MONOCYTES NFR BLD AUTO: 10 % — SIGNIFICANT CHANGE UP (ref 3–10)
NEUTROPHILS # BLD AUTO: 2.8 K/UL — SIGNIFICANT CHANGE UP (ref 1.8–8)
NEUTROPHILS NFR BLD AUTO: 47.3 % — SIGNIFICANT CHANGE UP (ref 37–73)
PHOSPHATE SERPL-MCNC: 2.8 MG/DL — SIGNIFICANT CHANGE UP (ref 2.4–4.7)
PLATELET # BLD AUTO: 209 K/UL — SIGNIFICANT CHANGE UP (ref 150–400)
POTASSIUM SERPL-MCNC: 3.5 MMOL/L — SIGNIFICANT CHANGE UP (ref 3.5–5.3)
POTASSIUM SERPL-SCNC: 3.5 MMOL/L — SIGNIFICANT CHANGE UP (ref 3.5–5.3)
PROT SERPL-MCNC: 6.8 G/DL — SIGNIFICANT CHANGE UP (ref 6.6–8.7)
RBC # BLD: 4.72 M/UL — SIGNIFICANT CHANGE UP (ref 4.6–6.2)
RBC # FLD: 13.2 % — SIGNIFICANT CHANGE UP (ref 11–15.6)
SODIUM SERPL-SCNC: 135 MMOL/L — SIGNIFICANT CHANGE UP (ref 135–145)
SPECIMEN SOURCE: SIGNIFICANT CHANGE UP
WBC # BLD: 6 K/UL — SIGNIFICANT CHANGE UP (ref 4.8–10.8)
WBC # FLD AUTO: 6 K/UL — SIGNIFICANT CHANGE UP (ref 4.8–10.8)

## 2017-07-10 PROCEDURE — 99233 SBSQ HOSP IP/OBS HIGH 50: CPT | Mod: GC

## 2017-07-10 RX ORDER — ACETAMINOPHEN 500 MG
650 TABLET ORAL EVERY 6 HOURS
Qty: 0 | Refills: 0 | Status: DISCONTINUED | OUTPATIENT
Start: 2017-07-10 | End: 2017-07-10

## 2017-07-10 RX ORDER — INSULIN LISPRO 100/ML
7 VIAL (ML) SUBCUTANEOUS
Qty: 0 | Refills: 0 | Status: DISCONTINUED | OUTPATIENT
Start: 2017-07-10 | End: 2017-07-11

## 2017-07-10 RX ORDER — POTASSIUM CHLORIDE 20 MEQ
40 PACKET (EA) ORAL ONCE
Qty: 0 | Refills: 0 | Status: COMPLETED | OUTPATIENT
Start: 2017-07-10 | End: 2017-07-10

## 2017-07-10 RX ORDER — INSULIN GLARGINE 100 [IU]/ML
25 INJECTION, SOLUTION SUBCUTANEOUS AT BEDTIME
Qty: 0 | Refills: 0 | Status: DISCONTINUED | OUTPATIENT
Start: 2017-07-10 | End: 2017-07-11

## 2017-07-10 RX ORDER — ACETAMINOPHEN 500 MG
650 TABLET ORAL EVERY 6 HOURS
Qty: 0 | Refills: 0 | Status: DISCONTINUED | OUTPATIENT
Start: 2017-07-10 | End: 2017-07-12

## 2017-07-10 RX ADMIN — GABAPENTIN 300 MILLIGRAM(S): 400 CAPSULE ORAL at 12:07

## 2017-07-10 RX ADMIN — Medication 10 MILLIGRAM(S): at 12:07

## 2017-07-10 RX ADMIN — Medication 8: at 12:09

## 2017-07-10 RX ADMIN — INSULIN GLARGINE 25 UNIT(S): 100 INJECTION, SOLUTION SUBCUTANEOUS at 22:21

## 2017-07-10 RX ADMIN — Medication 7 UNIT(S): at 16:57

## 2017-07-10 RX ADMIN — Medication 20 MILLIGRAM(S): at 12:07

## 2017-07-10 RX ADMIN — Medication 4: at 16:58

## 2017-07-10 RX ADMIN — Medication 650 MILLIGRAM(S): at 18:25

## 2017-07-10 RX ADMIN — Medication 7 UNIT(S): at 12:07

## 2017-07-10 RX ADMIN — Medication 40 MILLIEQUIVALENT(S): at 14:33

## 2017-07-10 RX ADMIN — Medication 650 MILLIGRAM(S): at 18:55

## 2017-07-10 RX ADMIN — LOSARTAN POTASSIUM 100 MILLIGRAM(S): 100 TABLET, FILM COATED ORAL at 05:42

## 2017-07-10 RX ADMIN — Medication 8: at 22:25

## 2017-07-10 RX ADMIN — ENOXAPARIN SODIUM 40 MILLIGRAM(S): 100 INJECTION SUBCUTANEOUS at 12:07

## 2017-07-10 RX ADMIN — Medication 5 MILLIGRAM(S): at 05:42

## 2017-07-10 RX ADMIN — Medication 5 MILLIGRAM(S): at 23:15

## 2017-07-10 RX ADMIN — Medication 4: at 08:26

## 2017-07-10 RX ADMIN — SIMVASTATIN 10 MILLIGRAM(S): 20 TABLET, FILM COATED ORAL at 23:15

## 2017-07-10 NOTE — PROGRESS NOTE ADULT - SUBJECTIVE AND OBJECTIVE BOX
Resident Progress Note  Patient is a 66y old  Male who presents with a chief complaint of Hyperglycemic hyperosmolar state (2017 17:34)    INTERVAL/OVERNIGHT EVENTS: Patient seen and examined bedside this morning. No acute overnight events. States polyuria persists but has improved compared to when he was home.  Tolerating po diet, Ambulating w/o difficulty, Voiding- w/o difficulty, Last Bowel Movement this morning    OBJECTIVE: Vital Signs Last 24 Hrs  T(C): 37 (2017 23:44), Max: 37.6 (2017 15:53)  T(F): 98.6 (2017 23:44), Max: 99.6 (2017 15:53)  HR: 90 (10 Jul 2017 05:43) (57 - 93)  BP: 154/88 (10 Jul 2017 05:43) (114/70 - 155/86)  RR: 22 (2017 23:44) (18 - 22)  SpO2: 93% (2017 23:44) (93% - 100%)    2017 07:01  -  10 Jul 2017 07:00  --------------------------------------------------------  IN: 2220 mL / OUT: 1603 mL / NET: 617 mL        PHYSICAL EXAM:  Constitutional: Obese, adult male, NAD, pleasant  HEENT: NC AT EOMI, moist mucous membranes  Neck: Supple,  No JVD,   Respiratory: Good respiratory effort, good air entry, clear to auscultation bilaterally  Cardiovascular: S1 and S2, RRR, no M/G/R  Gastrointestinal: BS+, soft, non-tender, non-distended, non-tender  Extremities: No peripheral edema  Vascular: 2+ peripheral pulses  Neurological: A/O x 3, no focal deficits  Psychiatric: Normal mood, normal affect  Musculoskeletal: 5/5 strength b/l upper and lower extremities  Skin: No rashes    MEDICATIONS  (STANDING):  simvastatin 10 milliGRAM(s) Oral at bedtime  PARoxetine 20 milliGRAM(s) Oral daily  losartan 100 milliGRAM(s) Oral daily  insulin glargine Injectable (LANTUS) 20 Unit(s) SubCutaneous at bedtime  insulin lispro Injectable (HumaLOG) 5 Unit(s) SubCutaneous three times a day before meals  insulin lispro (HumaLOG) corrective regimen sliding scale   SubCutaneous three times a day before meals  insulin lispro (HumaLOG) corrective regimen sliding scale   SubCutaneous at bedtime  dextrose 5%. 1000 milliLiter(s) (50 mL/Hr) IV Continuous <Continuous>  dextrose 50% Injectable 12.5 Gram(s) IV Push once  dextrose 50% Injectable 25 Gram(s) IV Push once  dextrose 50% Injectable 25 Gram(s) IV Push once  busPIRone 5 milliGRAM(s) Oral three times a day  baclofen 10 milliGRAM(s) Oral daily  gabapentin 300 milliGRAM(s) Oral daily  enoxaparin Injectable 40 milliGRAM(s) SubCutaneous daily    MEDICATIONS  (PRN):  dextrose Gel 1 Dose(s) Oral once PRN Blood Glucose LESS THAN 70 milliGRAM(s)/deciliter  glucagon  Injectable 1 milliGRAM(s) IntraMuscular once PRN Glucose LESS THAN 70 milligrams/deciliter      LABS:                        13.1   6.0   )-----------( 209      ( 10 Jul 2017 05:37 )             38.3     07-10    135  |  102  |  10.0  ----------------------------<  245<H>  3.5   |  23.0  |  0.77    Ca    8.5<L>      10 Jul 2017 05:37  Phos  2.8     07-10  Mg     2.0     07-10    TPro  6.8  /  Alb  3.7  /  TBili  0.4  /  DBili  x   /  AST  19  /  ALT  28  /  AlkPhos  75  07-10      Urinalysis Basic - ( 2017 18:51 )    Color: Yellow / Appearance: Clear / S.015 / pH: x  Gluc: x / Ketone: Small  / Bili: Negative / Urobili: Negative mg/dL   Blood: x / Protein: 15 mg/dL / Nitrite: Negative   Leuk Esterase: Negative / RBC: 0-2 /HPF / WBC 0-2   Sq Epi: x / Non Sq Epi: x / Bacteria: x    CAPILLARY BLOOD GLUCOSE  295 (2017 21:49)  272 (2017 16:00)  178 (2017 12:00)  142 (2017 08:00)

## 2017-07-10 NOTE — PATIENT PROFILE ADULT. - NS TRANSFER PATIENT BELONGINGS
2 sets of pajamas brown plaid and blue plaid/ 1 red sandals/ 2 grey towels/ 1 pack of black socks/ toilettries toothpaste/1 toothbrush/Other belongings/Cell Phone/PDA (specify)/Clothing

## 2017-07-10 NOTE — PROGRESS NOTE ADULT - PROBLEM SELECTOR PLAN 2
A1c 10.7  Received total of 45 units insulin 7/9/2017  Insulin adjusted: Lantus 25, Humalog 7 pre-meals, HISS  waiting to be seen by DM educator

## 2017-07-10 NOTE — CHART NOTE - NSCHARTNOTEFT_GEN_A_CORE
As per patient, he was scheduled for an echo today at [facility]. Spoke with Dr. Hines to let him know patient was in hospital. Anticipated day of discharge is tomorrow. Patient has a follow up appointment with him on 7/20. He says patient can have echo done in the office after discharge and keep follow up appointment.

## 2017-07-10 NOTE — PATIENT PROFILE ADULT. - TEACHING/LEARNING FACTORS INFLUENCE READINESS TO LEARN
acuteness of illness/pain/financial concerns/interest in learning/requests information/motivation to learn/lack of motivation

## 2017-07-11 LAB
ANION GAP SERPL CALC-SCNC: 13 MMOL/L — SIGNIFICANT CHANGE UP (ref 5–17)
BUN SERPL-MCNC: 13 MG/DL — SIGNIFICANT CHANGE UP (ref 8–20)
CALCIUM SERPL-MCNC: 8.8 MG/DL — SIGNIFICANT CHANGE UP (ref 8.6–10.2)
CHLORIDE SERPL-SCNC: 100 MMOL/L — SIGNIFICANT CHANGE UP (ref 98–107)
CO2 SERPL-SCNC: 25 MMOL/L — SIGNIFICANT CHANGE UP (ref 22–29)
CREAT SERPL-MCNC: 0.8 MG/DL — SIGNIFICANT CHANGE UP (ref 0.5–1.3)
GLUCOSE SERPL-MCNC: 274 MG/DL — HIGH (ref 70–115)
POTASSIUM SERPL-MCNC: 3.7 MMOL/L — SIGNIFICANT CHANGE UP (ref 3.5–5.3)
POTASSIUM SERPL-SCNC: 3.7 MMOL/L — SIGNIFICANT CHANGE UP (ref 3.5–5.3)
SODIUM SERPL-SCNC: 138 MMOL/L — SIGNIFICANT CHANGE UP (ref 135–145)

## 2017-07-11 PROCEDURE — 99233 SBSQ HOSP IP/OBS HIGH 50: CPT | Mod: GC

## 2017-07-11 RX ORDER — POTASSIUM CHLORIDE 20 MEQ
40 PACKET (EA) ORAL ONCE
Qty: 0 | Refills: 0 | Status: COMPLETED | OUTPATIENT
Start: 2017-07-11 | End: 2017-07-11

## 2017-07-11 RX ORDER — INSULIN GLARGINE 100 [IU]/ML
35 INJECTION, SOLUTION SUBCUTANEOUS AT BEDTIME
Qty: 0 | Refills: 0 | Status: DISCONTINUED | OUTPATIENT
Start: 2017-07-11 | End: 2017-07-12

## 2017-07-11 RX ORDER — INSULIN LISPRO 100/ML
10 VIAL (ML) SUBCUTANEOUS
Qty: 0 | Refills: 0 | Status: DISCONTINUED | OUTPATIENT
Start: 2017-07-11 | End: 2017-07-12

## 2017-07-11 RX ADMIN — Medication 2: at 11:58

## 2017-07-11 RX ADMIN — INSULIN GLARGINE 35 UNIT(S): 100 INJECTION, SOLUTION SUBCUTANEOUS at 22:03

## 2017-07-11 RX ADMIN — Medication 20 MILLIGRAM(S): at 13:51

## 2017-07-11 RX ADMIN — Medication 6: at 08:08

## 2017-07-11 RX ADMIN — GABAPENTIN 300 MILLIGRAM(S): 400 CAPSULE ORAL at 13:50

## 2017-07-11 RX ADMIN — Medication 40 MILLIEQUIVALENT(S): at 13:51

## 2017-07-11 RX ADMIN — Medication 5 MILLIGRAM(S): at 13:51

## 2017-07-11 RX ADMIN — Medication 10 UNIT(S): at 18:31

## 2017-07-11 RX ADMIN — Medication 5 MILLIGRAM(S): at 06:08

## 2017-07-11 RX ADMIN — SIMVASTATIN 10 MILLIGRAM(S): 20 TABLET, FILM COATED ORAL at 22:04

## 2017-07-11 RX ADMIN — ENOXAPARIN SODIUM 40 MILLIGRAM(S): 100 INJECTION SUBCUTANEOUS at 13:51

## 2017-07-11 RX ADMIN — Medication 7 UNIT(S): at 08:08

## 2017-07-11 RX ADMIN — LOSARTAN POTASSIUM 100 MILLIGRAM(S): 100 TABLET, FILM COATED ORAL at 06:08

## 2017-07-11 RX ADMIN — Medication 5 MILLIGRAM(S): at 22:04

## 2017-07-11 RX ADMIN — Medication 10 MILLIGRAM(S): at 13:51

## 2017-07-11 RX ADMIN — Medication 10 UNIT(S): at 11:58

## 2017-07-11 NOTE — PROGRESS NOTE ADULT - ASSESSMENT
65 y/o M with a h/o HTN, HLD, BPH, depression presents to ED with 1 month of worsening polyuria, polydipsia presented with A --> 20. B. AB.38/39/71/23.

## 2017-07-11 NOTE — PROGRESS NOTE ADULT - SUBJECTIVE AND OBJECTIVE BOX
CC: Patient is a 66 yr old male with pmhx of HTN, HLD, BPH and depression who presented with a finger stick of 900, diagnosed as Hyperglycemic hyperosmolar state      Patient seen and examined at bedside, No acute overnight events.  Patient ambulating, eating well, voiding and last BM overnight.  Patient c/o increased urinary frequency, denies abdominal pain.     ROS: Denies fever, chest pain, SOB, diaphoresis, diarrhea, constipation.    VS:   Vital Signs Last 24 Hrs  T(C): 36.8 (11 Jul 2017 07:50), Max: 36.9 (11 Jul 2017 00:15)  T(F): 98.3 (11 Jul 2017 07:50), Max: 98.5 (11 Jul 2017 00:15)  HR: 100 (11 Jul 2017 07:50) (93 - 100)  BP: 158/97 (11 Jul 2017 07:50) (134/80 - 158/97)  BP(mean): --  RR: 18 (11 Jul 2017 07:50) (18 - 18)  SpO2: 96% (11 Jul 2017 07:50) (96% - 98%)    Physical Exam:   Gen: NAD  HEENT: NCAT, EOMI, PERRLA  CVS: RRR, +S1/S2, no murmurs, rubs or gallops   Lungs: CTAB, no wheeze, rales, rhonchi  Abdomen: +BS, soft, nontender, nondistended  Ext: No cyanosis, edema   Neuro: AAOx3, motor and sensory are intact.     Labs:                        13.1   6.0   )-----------( 209      ( 10 Jul 2017 05:37 )             38.3   07-11    138  |  100  |  13.0  ----------------------------<  274<H>  3.7   |  25.0  |  0.80    Ca    8.8      11 Jul 2017 09:47  Phos  2.8     07-10  Mg     2.0     07-10    TPro  6.8  /  Alb  3.7  /  TBili  0.4  /  DBili  x   /  AST  19  /  ALT  28  /  AlkPhos  75  07-10          Medications:  MEDICATIONS  (STANDING):  simvastatin 10 milliGRAM(s) Oral at bedtime  PARoxetine 20 milliGRAM(s) Oral daily  losartan 100 milliGRAM(s) Oral daily  insulin lispro (HumaLOG) corrective regimen sliding scale   SubCutaneous three times a day before meals  insulin lispro (HumaLOG) corrective regimen sliding scale   SubCutaneous at bedtime  dextrose 5%. 1000 milliLiter(s) (50 mL/Hr) IV Continuous <Continuous>  dextrose 50% Injectable 12.5 Gram(s) IV Push once  dextrose 50% Injectable 25 Gram(s) IV Push once  dextrose 50% Injectable 25 Gram(s) IV Push once  busPIRone 5 milliGRAM(s) Oral three times a day  baclofen 10 milliGRAM(s) Oral daily  gabapentin 300 milliGRAM(s) Oral daily  enoxaparin Injectable 40 milliGRAM(s) SubCutaneous daily  insulin lispro Injectable (HumaLOG) 10 Unit(s) SubCutaneous three times a day before meals  insulin glargine Injectable (LANTUS) 35 Unit(s) SubCutaneous at bedtime    MEDICATIONS  (PRN):  dextrose Gel 1 Dose(s) Oral once PRN Blood Glucose LESS THAN 70 milliGRAM(s)/deciliter  glucagon  Injectable 1 milliGRAM(s) IntraMuscular once PRN Glucose LESS THAN 70 milligrams/deciliter  acetaminophen   Tablet. 650 milliGRAM(s) Oral every 6 hours PRN Moderate Pain (4 - 6) Patient seen and examined at bedside, No acute overnight events.  Patient ambulating, eating well, voiding and last BM overnight.  Patient c/o increased urinary frequency, denies abdominal pain.     ROS: Denies fever, chest pain, SOB, diaphoresis, diarrhea, constipation.    VS:   Vital Signs Last 24 Hrs  T(C): 36.8 (11 Jul 2017 07:50), Max: 36.9 (11 Jul 2017 00:15)  T(F): 98.3 (11 Jul 2017 07:50), Max: 98.5 (11 Jul 2017 00:15)  HR: 100 (11 Jul 2017 07:50) (93 - 100)  BP: 158/97 (11 Jul 2017 07:50) (134/80 - 158/97)  RR: 18 (11 Jul 2017 07:50) (18 - 18)  SpO2: 96% (11 Jul 2017 07:50) (96% - 98%)    Physical Exam:   Gen: NAD  HEENT: NCAT, EOMI, PERRLA  CVS: RRR, +S1/S2, no murmurs, rubs or gallops   Lungs: CTAB, no wheeze, rales, rhonchi  Abdomen: +BS, soft, nontender, nondistended  Ext: No cyanosis, edema   Neuro: AAOx3, motor and sensory are intact.     Labs:                        13.1   6.0   )-----------( 209      ( 10 Jul 2017 05:37 )             38.3   07-11    138  |  100  |  13.0  ----------------------------<  274<H>  3.7   |  25.0  |  0.80    Ca    8.8      11 Jul 2017 09:47  Phos  2.8     07-10  Mg     2.0     07-10    TPro  6.8  /  Alb  3.7  /  TBili  0.4  /  DBili  x   /  AST  19  /  ALT  28  /  AlkPhos  75  07-10          Medications:  MEDICATIONS  (STANDING):  simvastatin 10 milliGRAM(s) Oral at bedtime  PARoxetine 20 milliGRAM(s) Oral daily  losartan 100 milliGRAM(s) Oral daily  insulin lispro (HumaLOG) corrective regimen sliding scale   SubCutaneous three times a day before meals  insulin lispro (HumaLOG) corrective regimen sliding scale   SubCutaneous at bedtime  dextrose 5%. 1000 milliLiter(s) (50 mL/Hr) IV Continuous <Continuous>  dextrose 50% Injectable 12.5 Gram(s) IV Push once  dextrose 50% Injectable 25 Gram(s) IV Push once  dextrose 50% Injectable 25 Gram(s) IV Push once  busPIRone 5 milliGRAM(s) Oral three times a day  baclofen 10 milliGRAM(s) Oral daily  gabapentin 300 milliGRAM(s) Oral daily  enoxaparin Injectable 40 milliGRAM(s) SubCutaneous daily  insulin lispro Injectable (HumaLOG) 10 Unit(s) SubCutaneous three times a day before meals  insulin glargine Injectable (LANTUS) 35 Unit(s) SubCutaneous at bedtime    MEDICATIONS  (PRN):  dextrose Gel 1 Dose(s) Oral once PRN Blood Glucose LESS THAN 70 milliGRAM(s)/deciliter  glucagon  Injectable 1 milliGRAM(s) IntraMuscular once PRN Glucose LESS THAN 70 milligrams/deciliter  acetaminophen   Tablet. 650 milliGRAM(s) Oral every 6 hours PRN Moderate Pain (4 - 6)

## 2017-07-11 NOTE — ADVANCED PRACTICE NURSE CONSULT - RECOMMEDATIONS
continue diabetes self mnaagement education  pt to draw and inject insulin using insulin syringe and rn supervion  pt to return demonstration of fs successfuly on glucometer provided prior to dc

## 2017-07-11 NOTE — ADVANCED PRACTICE NURSE CONSULT - ASSESSMENT
went to see pt a1c >10. pt speaks Mong[place]ian, [phone] w language. pt did nt knoiw he has diabetes. pt was educated about diabetes disease process and the importance of using unsuling @ this time in addition to pills. pt verbalized understanding. pt states he is ok w drawing and injecting insulin. pt was educated about the importance of testing bg and was advised to test ng 2-3xdaily parameters provided. a new glucometer provided. pt was advised to ambulate after meals to improve glycemic control.

## 2017-07-11 NOTE — PROGRESS NOTE ADULT - PROBLEM SELECTOR PLAN 2
A1c 10.7  Received total of 62 units insulin 7/10/2017  FS yesterday were 329, 241, 235  Insulin adjusted: Lantus 35, Humalog 10 pre-meals, HISS  waiting to be seen by DM educator

## 2017-07-12 ENCOUNTER — TRANSCRIPTION ENCOUNTER (OUTPATIENT)
Age: 67
End: 2017-07-12

## 2017-07-12 VITALS
SYSTOLIC BLOOD PRESSURE: 150 MMHG | RESPIRATION RATE: 18 BRPM | TEMPERATURE: 98 F | WEIGHT: 216.27 LBS | OXYGEN SATURATION: 93 % | DIASTOLIC BLOOD PRESSURE: 97 MMHG | HEART RATE: 114 BPM

## 2017-07-12 LAB
ANION GAP SERPL CALC-SCNC: 16 MMOL/L — SIGNIFICANT CHANGE UP (ref 5–17)
BUN SERPL-MCNC: 13 MG/DL — SIGNIFICANT CHANGE UP (ref 8–20)
CALCIUM SERPL-MCNC: 9 MG/DL — SIGNIFICANT CHANGE UP (ref 8.6–10.2)
CHLORIDE SERPL-SCNC: 97 MMOL/L — LOW (ref 98–107)
CO2 SERPL-SCNC: 24 MMOL/L — SIGNIFICANT CHANGE UP (ref 22–29)
CREAT SERPL-MCNC: 0.93 MG/DL — SIGNIFICANT CHANGE UP (ref 0.5–1.3)
GLUCOSE SERPL-MCNC: 205 MG/DL — HIGH (ref 70–115)
MAGNESIUM SERPL-MCNC: 2.2 MG/DL — SIGNIFICANT CHANGE UP (ref 1.6–2.6)
POTASSIUM SERPL-MCNC: 3.6 MMOL/L — SIGNIFICANT CHANGE UP (ref 3.5–5.3)
POTASSIUM SERPL-SCNC: 3.6 MMOL/L — SIGNIFICANT CHANGE UP (ref 3.5–5.3)
SODIUM SERPL-SCNC: 137 MMOL/L — SIGNIFICANT CHANGE UP (ref 135–145)

## 2017-07-12 PROCEDURE — 99238 HOSP IP/OBS DSCHRG MGMT 30/<: CPT

## 2017-07-12 RX ORDER — INSULIN LISPRO 100/ML
10 VIAL (ML) SUBCUTANEOUS
Qty: 1 | Refills: 0 | OUTPATIENT
Start: 2017-07-12 | End: 2017-08-11

## 2017-07-12 RX ORDER — ENOXAPARIN SODIUM 100 MG/ML
35 INJECTION SUBCUTANEOUS
Qty: 1 | Refills: 0 | OUTPATIENT
Start: 2017-07-12 | End: 2017-07-19

## 2017-07-12 RX ORDER — BECLOMETHASONE DIPROPIONATE 40 UG/1
1 AEROSOL, METERED RESPIRATORY (INHALATION)
Qty: 0 | Refills: 0 | COMMUNITY

## 2017-07-12 RX ORDER — INSULIN LISPRO 100/ML
10 VIAL (ML) SUBCUTANEOUS
Qty: 1 | Refills: 0 | OUTPATIENT
Start: 2017-07-12 | End: 2017-07-19

## 2017-07-12 RX ORDER — LOSARTAN POTASSIUM 100 MG/1
1 TABLET, FILM COATED ORAL
Qty: 0 | Refills: 0 | COMMUNITY

## 2017-07-12 RX ORDER — BACLOFEN 100 %
0 POWDER (GRAM) MISCELLANEOUS
Qty: 0 | Refills: 0 | COMMUNITY

## 2017-07-12 RX ORDER — GABAPENTIN 400 MG/1
0 CAPSULE ORAL
Qty: 0 | Refills: 0 | COMMUNITY

## 2017-07-12 RX ORDER — SIMVASTATIN 20 MG/1
1 TABLET, FILM COATED ORAL
Qty: 0 | Refills: 0 | COMMUNITY

## 2017-07-12 RX ORDER — INSULIN GLARGINE 100 [IU]/ML
35 INJECTION, SOLUTION SUBCUTANEOUS
Qty: 1 | Refills: 0 | OUTPATIENT
Start: 2017-07-12 | End: 2017-08-11

## 2017-07-12 RX ORDER — SIMVASTATIN 20 MG/1
1 TABLET, FILM COATED ORAL
Qty: 30 | Refills: 0 | OUTPATIENT
Start: 2017-07-12 | End: 2017-08-11

## 2017-07-12 RX ORDER — THEOPHYLLINE ANHYDROUS 200 MG
0 TABLET, EXTENDED RELEASE 12 HR ORAL
Qty: 0 | Refills: 0 | COMMUNITY

## 2017-07-12 RX ORDER — ALBUTEROL 90 UG/1
0 AEROSOL, METERED ORAL
Qty: 0 | Refills: 0 | COMMUNITY

## 2017-07-12 RX ADMIN — LOSARTAN POTASSIUM 100 MILLIGRAM(S): 100 TABLET, FILM COATED ORAL at 08:31

## 2017-07-12 RX ADMIN — Medication 2: at 11:56

## 2017-07-12 RX ADMIN — Medication 10 UNIT(S): at 11:57

## 2017-07-12 RX ADMIN — Medication 10 UNIT(S): at 07:52

## 2017-07-12 RX ADMIN — Medication 5 MILLIGRAM(S): at 06:28

## 2017-07-12 RX ADMIN — Medication 2: at 07:52

## 2017-07-12 NOTE — DISCHARGE NOTE ADULT - PATIENT PORTAL LINK FT
“You can access the [employer] Patient Portal, offered by [facility], by registering with the following website: [email or web address]”

## 2017-07-12 NOTE — DISCHARGE NOTE ADULT - CARE PROVIDER_API CALL
Marcela Felton), Family Medicine  [address] [place] [address]  Phone: [phone]  Fax: [phone]

## 2017-07-12 NOTE — DISCHARGE NOTE ADULT - MEDICATION SUMMARY - MEDICATIONS TO TAKE
I will START or STAY ON the medications listed below when I get home from the hospital:    losartan 100 mg oral tablet  -- 1 tab(s) by mouth once a day  -- Indication: For HTN (hypertension)    gabapentin 300 mg oral capsule  --  by mouth once a day  -- Indication: For Pain    PARoxetine 10 mg oral tablet  -- 1 tab(s) by mouth once a day  -- Indication: For Depression    Lantus 100 units/mL subcutaneous solution  -- 35 unit(s) subcutaneous once a day (at bedtime)  -- Do not drink alcoholic beverages when taking this medication.  It is very important that you take or use this exactly as directed.  Do not skip doses or discontinue unless directed by your doctor.  Keep in refrigerator.  Do not freeze.    -- Indication: For DM (diabetes mellitus)    HumaLOG 100 units/mL subcutaneous solution  -- 10 unit(s) subcutaneous 3 times a day (before meals)  -- Do not drink alcoholic beverages when taking this medication.  It is very important that you take or use this exactly as directed.  Do not skip doses or discontinue unless directed by your doctor.  Keep in refrigerator.  Do not freeze.    -- Indication: For DM (diabetes mellitus)    busPIRone 5 mg oral tablet  --  by mouth 3 times a day  -- Indication: For Depression

## 2017-07-12 NOTE — DISCHARGE NOTE ADULT - MEDICATION SUMMARY - MEDICATIONS TO CHANGE
I will SWITCH the dose or number of times a day I take the medications listed below when I get home from the hospital:    simvastatin 5 mg oral tablet  -- 1 tab(s) by mouth once a day (at bedtime)

## 2017-07-12 NOTE — DISCHARGE NOTE ADULT - SECONDARY DIAGNOSIS.
DM (diabetes mellitus) HTN (hypertension) BPH (benign prostatic hypertrophy) Hyperlipidemia Depression

## 2017-07-12 NOTE — PROGRESS NOTE ADULT - SUBJECTIVE AND OBJECTIVE BOX
Patient seen and examined at bedside, No acute overnight events.  Patient ambulating, eating well.   He complains of increased frequency in voiding and last BM yesterday.    ROS: Denies fever, chest pain, SOB, abdominal pain, diarrhea, constipation.    VS:   Vital Signs Last 24 Hrs  T(C): 37.2 (12 Jul 2017 01:43), Max: 37.2 (12 Jul 2017 01:43)  T(F): 98.9 (12 Jul 2017 01:43), Max: 98.9 (12 Jul 2017 01:43)  HR: 105 (12 Jul 2017 01:43) (100 - 105)  BP: 144/85 (12 Jul 2017 01:43) (120/75 - 158/97)  RR: 15 (12 Jul 2017 01:43) (15 - 18)  SpO2: 93% (12 Jul 2017 01:43) (93% - 99%)    Physical Exam:   Gen: NAD  HEENT: NCAT, EOMI, PERRLA  CVS: RRR, +S1/S2, no murmurs, rubs or gallops appreciated  Lungs: CTAB, no wheeze, rales, rhonchi  Abdomen: +BS, soft, nontender, nondistended  Ext: No cyanosis, edema, DP pulse 2+  Neuro: AAOx3, motor and sensory intact.     Labs:  07-11    138  |  100  |  13.0  ----------------------------<  274<H>  3.7   |  25.0  |  0.80    Ca    8.8      11 Jul 2017 09:47      Medications:  MEDICATIONS  (STANDING):  simvastatin 10 milliGRAM(s) Oral at bedtime  PARoxetine 20 milliGRAM(s) Oral daily  losartan 100 milliGRAM(s) Oral daily  insulin lispro (HumaLOG) corrective regimen sliding scale   SubCutaneous three times a day before meals  insulin lispro (HumaLOG) corrective regimen sliding scale   SubCutaneous at bedtime  dextrose 5%. 1000 milliLiter(s) (50 mL/Hr) IV Continuous <Continuous>  dextrose 50% Injectable 12.5 Gram(s) IV Push once  dextrose 50% Injectable 25 Gram(s) IV Push once  dextrose 50% Injectable 25 Gram(s) IV Push once  busPIRone 5 milliGRAM(s) Oral three times a day  baclofen 10 milliGRAM(s) Oral daily  gabapentin 300 milliGRAM(s) Oral daily  enoxaparin Injectable 40 milliGRAM(s) SubCutaneous daily  insulin lispro Injectable (HumaLOG) 10 Unit(s) SubCutaneous three times a day before meals  insulin glargine Injectable (LANTUS) 35 Unit(s) SubCutaneous at bedtime    MEDICATIONS  (PRN):  dextrose Gel 1 Dose(s) Oral once PRN Blood Glucose LESS THAN 70 milliGRAM(s)/deciliter  glucagon  Injectable 1 milliGRAM(s) IntraMuscular once PRN Glucose LESS THAN 70 milligrams/deciliter  acetaminophen   Tablet. 650 milliGRAM(s) Oral every 6 hours PRN Moderate Pain (4 - 6)

## 2017-07-12 NOTE — PROGRESS NOTE ADULT - PROBLEM SELECTOR PROBLEM 7
BPH (benign prostatic hypertrophy)
Need for prophylactic measure
Need for prophylactic measure
BPH (benign prostatic hypertrophy)

## 2017-07-12 NOTE — PROGRESS NOTE ADULT - ATTENDING COMMENTS
I have personally seen and examined the patient. I agree with the above history, physical and plan which I have reviewed and edited as appropriate. Patient awake, alert, oriented, calm, comfortable, no complaints. Cardiopulmonary exam is within normal limits. Patient was admitted due to hyperosmolar hyperglycemic syndrome.  At this point blood sugar is better control and will d/c home on basal / bolus insulin. Diabetic teaching / education was done and at this time patient has expressed good understanding of all.  Patient is a newly diagnosed diabetic and will follow up with PCP today in the pm.  Also will see his cardiologist on 7/20/17.
Patient seen, evaluated and examined by me.  Case discussed with resident.  Agree with the resident's findings and plan as documented in the resident's note.  Newly diagnosed diabetes, continue with basal / bolus insulin. Diabetic education and life style modification teaching.
I have personally seen and examined the patient. I agree with the above history, physical and plan which I have reviewed.  Diabetes uncontrolled will increase both basal and bolus insulin regimen.

## 2017-07-12 NOTE — DISCHARGE NOTE ADULT - CARE PLAN
Principal Discharge DX:	Hyperglycemic hyperosmolar nonketotic coma  Goal:	Outpatient management  Instructions for follow-up, activity and diet:	Medications, diet and activity as above  Follow up with PMD within one to two days of discharge from hospital.  Secondary Diagnosis:	DM (diabetes mellitus)  Instructions for follow-up, activity and diet:	Medications, diet and activity as above  Follow up with PMD within one to two days of discharge from hospital.  Secondary Diagnosis:	HTN (hypertension)  Instructions for follow-up, activity and diet:	Medications, diet and activity as above  Follow up with PMD within one to two days of discharge from hospital.  Secondary Diagnosis:	BPH (benign prostatic hypertrophy)  Instructions for follow-up, activity and diet:	Medications, diet and activity as above  Follow up with PMD within one to two days of discharge from hospital.  Secondary Diagnosis:	Hyperlipidemia  Instructions for follow-up, activity and diet:	Medications, diet and activity as above  Follow up with PMD within one to two days of discharge from hospital.  Secondary Diagnosis:	Depression  Instructions for follow-up, activity and diet:	Medications, diet and activity as above  Follow up with PMD within one to two days of discharge from hospital.  Instructions for follow-up, activity and diet:	Medications, diet and activity as above  Follow up with PMD within one to two days of discharge from hospital.

## 2017-07-12 NOTE — DISCHARGE NOTE ADULT - HOSPITAL COURSE
Patient is a 66y old  Male who presents with a chief complaint of polyuria, polydipsia, found to have a serum blood glucose of 900, diagnoses with Hyperglycemic hyperosmolar state Patient admitted and managed under ICU initially and downgraded to medicine resident team. Patient is a 66 year old male with a past medical history of hypertension, hyperlipidemia, benign prostatic hyperplasia and depression who presented to the ED with a one month history of worsening polyuria and polydipsia.  He was diagnosed with Hyperglycemic hyperosmolar nonketotic coma.  He received insulin, Lantus and IV fluids. Patient's blood sugar decreased from 900s to 243.  Patient was admitted and managed under ICU initially and downgraded to the medicine resident team. Patient was not aware he had diabetes mellitus. He developed pre-renal azotemia, likely due to dehydration from hyperglycemic state. It resolved after hydration.   He was given Losartan for hypertension. He was continued on his home medication Buspirone and Paroxetine for depression, Tamsulosin for BPH. He was given Lovenox for prophylactic measure. He was counseled on managing diabetes at home.

## 2017-07-12 NOTE — PROGRESS NOTE ADULT - ASSESSMENT
65 y/o M with a h/o HTN, HLD, BPH, depression presents to ED with 1 month of worsening polyuria, polydipsia diagnosed with hyperosmolar hyperglycemic coma which has since resolved.

## 2017-07-12 NOTE — PROGRESS NOTE ADULT - PROBLEM SELECTOR PLAN 2
A1c 10.7  Received total of 73 units insulin 7/11/2017  FS yesterday were 194, 144, 194, 253.   Insulin adjusted: Lantus 35, Humalog 10 pre-meals, HISS  waiting to be seen by DM educator

## 2017-07-12 NOTE — DISCHARGE NOTE ADULT - PLAN OF CARE
Outpatient management Medications, diet and activity as above  Follow up with PMD within one to two days of discharge from hospital.

## 2017-07-12 NOTE — PROGRESS NOTE ADULT - PROBLEM SELECTOR PROBLEM 1
Hyperosmolar non-ketotic state in patient with type 2 diabetes mellitus
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
## 2017-07-12 NOTE — DISCHARGE NOTE ADULT - NS AS ACTIVITY OBS
Walking-Outdoors allowed/Sex allowed/Return to Work/School allowed/Walking-Indoors allowed/Stairs allowed

## 2017-07-19 ENCOUNTER — APPOINTMENT (OUTPATIENT)
Dept: PULMONOLOGY | Facility: CLINIC | Age: 67
End: 2017-07-19

## 2017-07-19 VITALS
DIASTOLIC BLOOD PRESSURE: 70 MMHG | WEIGHT: 198 LBS | HEIGHT: 65 IN | OXYGEN SATURATION: 95 % | HEART RATE: 101 BPM | SYSTOLIC BLOOD PRESSURE: 110 MMHG | BODY MASS INDEX: 32.99 KG/M2

## 2017-07-19 DIAGNOSIS — Z87.891 PERSONAL HISTORY OF NICOTINE DEPENDENCE: ICD-10-CM

## 2017-07-19 DIAGNOSIS — Z86.59 PERSONAL HISTORY OF OTHER MENTAL AND BEHAVIORAL DISORDERS: ICD-10-CM

## 2017-07-19 DIAGNOSIS — Z86.39 PERSONAL HISTORY OF OTHER ENDOCRINE, NUTRITIONAL AND METABOLIC DISEASE: ICD-10-CM

## 2017-07-19 DIAGNOSIS — R06.83 SNORING: ICD-10-CM

## 2017-07-19 DIAGNOSIS — R06.09 OTHER FORMS OF DYSPNEA: ICD-10-CM

## 2017-07-19 DIAGNOSIS — E66.9 OBESITY, UNSPECIFIED: ICD-10-CM

## 2017-07-19 DIAGNOSIS — Z87.438 PERSONAL HISTORY OF OTHER DISEASES OF MALE GENITAL ORGANS: ICD-10-CM

## 2017-07-19 RX ORDER — TRAMADOL HYDROCHLORIDE 25 MG/1
TABLET, COATED ORAL
Refills: 0 | Status: DISCONTINUED | COMMUNITY
End: 2017-07-19

## 2017-07-19 RX ORDER — CYCLOBENZAPRINE HYDROCHLORIDE 5 MG/1
5 TABLET, FILM COATED ORAL
Qty: 30 | Refills: 0 | Status: DISCONTINUED | COMMUNITY
Start: 2017-02-14 | End: 2017-07-19

## 2017-07-19 RX ORDER — METOPROLOL SUCCINATE 50 MG/1
50 TABLET, EXTENDED RELEASE ORAL
Refills: 0 | Status: ACTIVE | COMMUNITY

## 2017-07-19 RX ORDER — MELOXICAM 7.5 MG/1
7.5 TABLET ORAL
Qty: 60 | Refills: 0 | Status: DISCONTINUED | COMMUNITY
Start: 2017-02-16 | End: 2017-07-19

## 2017-07-19 RX ORDER — ATORVASTATIN CALCIUM 10 MG/1
10 TABLET, FILM COATED ORAL
Refills: 0 | Status: ACTIVE | COMMUNITY

## 2017-07-19 RX ORDER — METFORMIN HYDROCHLORIDE 850 MG/1
850 TABLET, COATED ORAL
Refills: 0 | Status: ACTIVE | COMMUNITY

## 2017-07-19 RX ORDER — IBUPROFEN 600 MG/1
600 TABLET, FILM COATED ORAL
Refills: 0 | Status: DISCONTINUED | COMMUNITY
End: 2017-07-19

## 2017-07-23 PROCEDURE — T1013: CPT

## 2017-07-23 PROCEDURE — 99285 EMERGENCY DEPT VISIT HI MDM: CPT | Mod: 25

## 2017-07-23 PROCEDURE — 82803 BLOOD GASES ANY COMBINATION: CPT

## 2017-07-23 PROCEDURE — 80053 COMPREHEN METABOLIC PANEL: CPT

## 2017-07-23 PROCEDURE — 81001 URINALYSIS AUTO W/SCOPE: CPT

## 2017-07-23 PROCEDURE — 36600 WITHDRAWAL OF ARTERIAL BLOOD: CPT

## 2017-07-23 PROCEDURE — 96372 THER/PROPH/DIAG INJ SC/IM: CPT | Mod: XU

## 2017-07-23 PROCEDURE — 80048 BASIC METABOLIC PNL TOTAL CA: CPT

## 2017-07-23 PROCEDURE — 87086 URINE CULTURE/COLONY COUNT: CPT

## 2017-07-23 PROCEDURE — 84100 ASSAY OF PHOSPHORUS: CPT

## 2017-07-23 PROCEDURE — 83735 ASSAY OF MAGNESIUM: CPT

## 2017-07-23 PROCEDURE — 76775 US EXAM ABDO BACK WALL LIM: CPT

## 2017-07-23 PROCEDURE — 83930 ASSAY OF BLOOD OSMOLALITY: CPT

## 2017-07-23 PROCEDURE — 80198 ASSAY OF THEOPHYLLINE: CPT

## 2017-07-23 PROCEDURE — 36415 COLL VENOUS BLD VENIPUNCTURE: CPT

## 2017-07-23 PROCEDURE — 85027 COMPLETE CBC AUTOMATED: CPT

## 2017-07-23 PROCEDURE — 96374 THER/PROPH/DIAG INJ IV PUSH: CPT

## 2017-07-23 PROCEDURE — 83036 HEMOGLOBIN GLYCOSYLATED A1C: CPT

## 2017-07-23 PROCEDURE — 83690 ASSAY OF LIPASE: CPT

## 2017-07-23 PROCEDURE — 81003 URINALYSIS AUTO W/O SCOPE: CPT

## 2017-07-23 PROCEDURE — 80076 HEPATIC FUNCTION PANEL: CPT

## 2018-01-08 NOTE — ED PROVIDER NOTE - CROS ED RESP ALL NEG
I called the pt to notify her of her time change for her surgery with Dr. Draper. Her new time is 1230 with a 1030 arrival time. She was not available, I left her a message with this information.   
The pt called, she can not get anyone to pick her up from her surgery with Dr. Draper on 01/10/18. She wanted to know if she could stay overnight in the hospital. I let her know the surgery is scheduled as a day surgery and the only way she could stay if it is medically necessary after surgery. She wanted to talk to Dr. Draper about this. I let her know I will send it to our [facility] and see if they could answer any of her questions. She is aware.   
negative...

## 2018-08-15 NOTE — ED ADULT NURSE NOTE - IN THE PAST 12 MONTHS HAVE YOU USED DRUGS OTHER THAN THOSE REQUIRED FOR MEDICAL REASON?
Small punctate foreign body at the 7 o'clock position, removed using cotton swab, small punctate corneal abrasion after removal/FOREIGN BODY
No

## 2019-03-11 NOTE — ED ADULT NURSE NOTE - NS ED NOTE ABUSE SUSPICION NEGLECT YN
no hematuria/no bladder infections/normal urinary frequency/no flank pain L/no flank pain R/no urine discoloration/no dysuria/no urinary hesitancy/no incontinence
No

## 2019-12-05 NOTE — H&P ADULT - NEGATIVE NEUROLOGICAL SYMPTOMS
36.1 no tremors/no headache/no syncope/no hemiparesis/no loss of consciousness/no loss of sensation/no focal seizures/no paresthesias/no weakness/no confusion

## 2022-04-18 NOTE — ED PROVIDER NOTE - CARDIAC MURMUR
Patient called and stated she was seen in there ER on 4/16 for a cough and shortness of breath. Patient stated that she had a positive covid test. Patient can be reached at [phone].    no murmur

## 2022-05-27 NOTE — ED PROVIDER NOTE - CROS ED ALLERGIC IMMUN ALL NEG
Feeling well  Denies cramps, dizziness, sob or pain  BP labile  24 hr UOP 2.1 L  Wt = 157 ->156.1 -> 152.9 --> 146.6 (down 10 kg) -> 147.3kg -> no weight today  Plans for discharge to ECF that can give dapto, awaiting insurance approval for [employer] [place] now    Nephrology consult hx 5/22/22  Davidson is a 66 year old male  Hx of DM2, HTN, HLD, neurogenic bladder, chronic diastolic HF and CKD 3b  Followed by Dr. Tavares in our office & due to be seen sometime in May/June  Had labs beginning of May in our office with creat 2.0 and historically has been 1.5-2 when euvolemic & normotensive    Multiple [facility] admissions and seen by our service June '19, Jan '20, Feb '20, Mar '20, Sept '20 (twice), June '21, July '21, Nov '21 and last in Feb '22  Renal bx 9/30/20 showed extensive ATN and underlying diabetic nephropathy & required HD from Sept '20 to Mar '21     Overall continues to display very poor insight into his medical conditions and treatments    Admitted 5/15 with left foot cellulitis & underlying abscess  Being followed by podiatry, ID and wound care  Underwent I&D bedside 5/19  Differing concerns for OM though MRI not convincing   Also with right foot diabetic ulcer    During admission was getting iv fluids  Creat on admission 1.93 -> -> -> -> 1.21 this am  However HFpEF now decompensated with fluid overload  Off iv fluids and received iv furosemide 20 mg yesterday  No weights since admission  BP not optimally controlled    Visit Vitals  BP (!) 149/83 (BP Location: RFA - Right forearm, Patient Position: Sitting)   Pulse 65   Temp 97.7 °F (36.5 °C) (Oral)   Resp 18   Ht 6' (1.829 m)   Wt (!) 147.3 kg (324 lb 11.8 oz)   SpO2 91%   BMI 44.04 kg/m²     Physical Exam  Constitutional:       General: He is not in acute distress.     Appearance: He is obese.   HENT:      Mouth/Throat:      Mouth: Mucous membranes are moist.   Eyes:      Extraocular Movements: Extraocular movements intact.   Cardiovascular:      Rate  and Rhythm: Normal rate and regular rhythm.   Pulmonary:      Breath sounds: No rales.   Abdominal:      General: There is distension.      Palpations: Abdomen is soft.      Tenderness: There is no abdominal tenderness.   Musculoskeletal:      Right lower leg: No edema.      Left lower leg: No edema.   Neurological:      Mental Status: He is alert and oriented to person, place, and time.       Recent Labs   Lab 05/27/22  0542 05/26/22  0847 05/25/22  0534   SODIUM 135 133* 136   POTASSIUM 3.4 3.9 3.9   CHLORIDE 96* 97 97   CO2 31 34* 32   BUN 20 22* 18   CREATININE 1.45* 1.53* 1.42*   CALCIUM 9.1 9.4 9.2   MG 1.8 1.8 1.7   PHOS 2.8 2.9 3.1   ALBUMIN 2.7*  --  2.8*   BILIRUBIN 0.3  --  0.3   ALKPT 100  --  114   GPT 26  --  29   AST 14  --  26   GLUCOSE 151* 238* 146*     Recent Labs   Lab 05/27/22  0542 05/25/22  0534 05/24/22  0544   WBC 8.9 8.5 6.9   HGB 9.7* 10.0* 8.9*   HCT 30.2* 31.2* 28.4*    287 273     Outpatient 5/5/22 labs:  PTH = 158  Vit D 25 = 22.8  Uric acid = 10.8  A1C = 9.0%  TG = 217  Creat = 2.0 (eGFR 34)  Cystatin-C = 2.60 (eGFR 22)    Impression  -LLE cellulitis, abscess post I&D +/- OM  -Right diabetic foot wound  -HFpEF compensated now  -HTN not controlled partly due to above, now labile  -GFR above baseline 2/t hypertension and hypervolemia, decreased as HTN and hypervolemia improved and now stable  -CKD 3b/4 from bx proven DN with baseline creat 1.5-2.0   -BPH with previous retention issues  -Anemia from CKD, infxn & iron deficiency, close to goal for CKD  -Hypokalemia a/w diuretics    Plan  -Furosemide 80 PO BID (5/26)  -Continue spironolactone 25 q12 & carvedilol 25 mg bid  -KCl 40 mEq given earlier  -Defer iv iron until infxn clears and/or off abx  -Tamsulosin 0.4 mg daily & am bladder scans   -Low sodium diet & 1.5L FR  -Cholecalciferol 50 mcg daily & calcitriol 0.25 mcg MWF  -Allopurinol 100 mg daily & titrate to 150 daily in 4 weeks  -Fenofibrate at reduced dose 54 mg daily  -No  iv contrast or NSAIDs  -Strict i/o's & daily wts  -Awaiting ECF placement  -At ECF will need weekly BMP, Mg and CPK  -At ECF need daily standing wts and notify Dr. Tavares if increasing or decreasing  -Follow up with Dr. Tavares in 6-8 weeks    Will sign off.   Please call with any questions or concerns.    Paged hospitalist.    Kelly Isbell MD  [facility] [place]  Contact via [employer]  Office/Answering service [phone]       negative...

## 2023-09-30 NOTE — ED ADULT NURSE NOTE - NS ED NURSE IV DC DT
"PRIMARY DIAGNOSIS: \"GENERIC\" NURSING  OUTPATIENT/OBSERVATION GOALS TO BE MET BEFORE DISCHARGE:  ADLs back to baseline: No    Activity and level of assistance: Up with standby assistance.    Pain status: Improved-controlled with oral pain medications.    Return to near baseline physical activity: No     Discharge Planner Nurse   Safe discharge environment identified: No  Barriers to discharge: Yes       Entered by: Rakesh Menjivar RN 09/30/2023 4:08 AM     Please review provider order for any additional goals.   Nurse to notify provider when observation goals have been met and patient is ready for discharge.  " 18-Jun-2017 19:22

## 2024-10-03 ENCOUNTER — EMERGENCY (EMERGENCY)
Facility: HOSPITAL | Age: 74
LOS: 1 days | Discharge: HOME | End: 2024-10-03
Attending: EMERGENCY MEDICINE
Payer: MEDICARE

## 2024-10-03 VITALS
TEMPERATURE: 98 F | SYSTOLIC BLOOD PRESSURE: 161 MMHG | HEART RATE: 79 BPM | RESPIRATION RATE: 20 BRPM | WEIGHT: 240.08 LBS | OXYGEN SATURATION: 94 % | DIASTOLIC BLOOD PRESSURE: 89 MMHG

## 2024-10-03 PROBLEM — I10 ESSENTIAL (PRIMARY) HYPERTENSION: Chronic | Status: ACTIVE | Noted: 2017-07-08

## 2024-10-03 PROBLEM — F32.9 MAJOR DEPRESSIVE DISORDER, SINGLE EPISODE, UNSPECIFIED: Chronic | Status: ACTIVE | Noted: 2017-07-08

## 2024-10-03 PROBLEM — E11.9 TYPE 2 DIABETES MELLITUS WITHOUT COMPLICATIONS: Chronic | Status: ACTIVE | Noted: 2017-07-08

## 2024-10-03 PROBLEM — E78.5 HYPERLIPIDEMIA, UNSPECIFIED: Chronic | Status: ACTIVE | Noted: 2017-07-08

## 2024-10-03 PROBLEM — N40.0 BENIGN PROSTATIC HYPERPLASIA WITHOUT LOWER URINARY TRACT SYMPTOMS: Chronic | Status: ACTIVE | Noted: 2017-07-08

## 2024-10-03 PROCEDURE — T1013: CPT

## 2024-10-03 PROCEDURE — 93005 ELECTROCARDIOGRAM TRACING: CPT

## 2024-10-03 PROCEDURE — 99283 EMERGENCY DEPT VISIT LOW MDM: CPT | Mod: 25

## 2024-10-03 PROCEDURE — 93010 ELECTROCARDIOGRAM REPORT: CPT

## 2024-10-03 PROCEDURE — 99283 EMERGENCY DEPT VISIT LOW MDM: CPT

## 2024-10-03 PROCEDURE — 82962 GLUCOSE BLOOD TEST: CPT

## 2024-10-03 RX ORDER — ACETAMINOPHEN 325 MG
650 TABLET ORAL ONCE
Refills: 0 | Status: COMPLETED | OUTPATIENT
Start: 2024-10-03 | End: 2024-10-03

## 2024-10-03 NOTE — ED PROVIDER NOTE - PROGRESS NOTE DETAILS
Mirna: pt with steady gait. oriented. knows his address. requesting d/c. going to take a cab home. clinically appropriate. stable for d/c. return precautions.

## 2024-10-03 NOTE — ED ADULT TRIAGE NOTE - CHIEF COMPLAINT QUOTE
pt BIBEMS from home for alcohol intox which he drinks daily, also has back pain and  hyperglycemia glucose and EKG obtained in triage. pt endorses falling a month ago and landed on his buttocks

## 2024-10-03 NOTE — ED PROVIDER NOTE - CLINICAL SUMMARY MEDICAL DECISION MAKING FREE TEXT BOX
Mirna: 73 y/o male hx dm, htn, bph, depression, frequent etoh present for evaluation after drinking etoh tonight. states lives alone, had a few drinks, then started hiccuping. starting to breathe into bag to help which usually does help but wasn't improving. called ambulance. since has improved and no longer has any acute complaint. states has chronic unchanged lower back pain, no neuro symptoms, no recent trauma (last >1 month ago). no other complaints. neuro intact, no external sign of trauma. no shanita ttp. requesting med for chronic lower back pain. will observe, reassess

## 2024-10-03 NOTE — ED PROVIDER NOTE - OBJECTIVE STATEMENT
ED  Tato 75 y/o male hx dm, htn, bph, depression, frequent etoh present for evaluation after drinking etoh tonight. states lives alone, had a few drinks, then started hiccuping. starting to breathe into bag to help which usually does help but wasn't improving. called ambulance. since has improved and no longer has any acute complaint. states has chronic unchanged lower back pain, no neuro symptoms, no recent trauma (last >1 month ago). no other complaints.

## 2024-10-03 NOTE — ED PROVIDER NOTE - PHYSICAL EXAMINATION
Gen: No acute distress, non toxic  HEENT: Mucous membranes moist, pink conjunctivae, EOMI. ncat  Neck: no midline ttp  CV: RRR, nl s1/s2.  Resp: CTAB, normal rate and effort  GI: Abdomen soft, NT, ND. No rebound, no guarding  : No CVAT  Neuro: A&O x 3, moving all 4 extremities  MSK: No midline spine or joint tenderness to palpation  Skin: No rashes. intact and perfused.

## 2024-10-03 NOTE — ED ADULT NURSE NOTE - OBJECTIVE STATEMENT
pt presents to ED for alcohol intoxication. pt admits to drinking alcohol. pt is a&ox4. respirations even and unlabored. pt is able to ambulate with steady gait. belongings returned to pt.

## 2024-10-03 NOTE — ED PROVIDER NOTE - NSFOLLOWUPINSTRUCTIONS_ED_ALL_ED_FT
Abuso de alcohol    La intoxicación por alcohol ocurre cuando la cantidad de alcohol que radha persona ha consumido afecta gary capacidad para funcionar mental y físicamente. El consumo crónico de alcohol también puede provocar radha variedad de problemas de lorraine, matthew enfermedades neurológicas, enfermedades del estómago, enfermedades del corazón, enfermedades del hígado, etc. No conduzca después de beber alcohol. Beber suficiente alcohol para terminar en radha jaqueline de emergencias sugiere que puede tener un problema de abuso de alcohol. Busque ayuda en un centro de adicción a las drogas.    BUSQUE ATENCIÓN MÉDICA INMEDIATA SI TIENE ALGUNO DE LOS SIGUIENTES SÍNTOMAS: convulsiones, vómitos con warren, warren en las heces, aturdimiento / mareos, o temblores o temblores cuando gavin de beber.

## 2024-10-03 NOTE — ED PROVIDER NOTE - PATIENT PORTAL LINK FT
You can access the FollowMyHealth Patient Portal offered by [facility] by registering at the following website: [email or web address]. By joining [facility]’s FollowMyHealth portal, you will also be able to view your health information using other applications (apps) compatible with our system.

## 2024-10-06 DIAGNOSIS — I10 ESSENTIAL (PRIMARY) HYPERTENSION: ICD-10-CM

## 2024-10-06 DIAGNOSIS — Z60.2 PROBLEMS RELATED TO LIVING ALONE: ICD-10-CM

## 2024-10-06 DIAGNOSIS — F10.10 ALCOHOL ABUSE, UNCOMPLICATED: ICD-10-CM

## 2024-10-06 DIAGNOSIS — N40.0 BENIGN PROSTATIC HYPERPLASIA WITHOUT LOWER URINARY TRACT SYMPTOMS: ICD-10-CM

## 2024-10-06 DIAGNOSIS — E11.9 TYPE 2 DIABETES MELLITUS WITHOUT COMPLICATIONS: ICD-10-CM

## 2024-10-06 DIAGNOSIS — G89.29 OTHER CHRONIC PAIN: ICD-10-CM

## 2024-10-06 DIAGNOSIS — F32.A DEPRESSION, UNSPECIFIED: ICD-10-CM

## 2024-10-06 DIAGNOSIS — M54.50 LOW BACK PAIN, UNSPECIFIED: ICD-10-CM

## 2024-10-06 SDOH — SOCIAL STABILITY - SOCIAL INSECURITY: PROBLEMS RELATED TO LIVING ALONE: Z60.2

## 2025-05-28 NOTE — ED PROVIDER NOTE - CARDIAC HEART SOUNDS
[FreeTextEntry1] : Cervical & Lumbar Spine X-rays (3/1/25): - No acute fracture, subluxation, significant disc space narrowing, or spondylolisthesis at cervical or lumbar spine  MRI Brain & MRI Cervical Spine (4/24/25): - No acute intracranial abnormality - Likely artifactual punctate high T2/FLAIR signal on MRI Brain study - No cervical spinal canal or spinal cord abnormality S1-S2